# Patient Record
Sex: FEMALE | Race: WHITE | NOT HISPANIC OR LATINO | Employment: FULL TIME | URBAN - NONMETROPOLITAN AREA
[De-identification: names, ages, dates, MRNs, and addresses within clinical notes are randomized per-mention and may not be internally consistent; named-entity substitution may affect disease eponyms.]

---

## 2022-08-18 ENCOUNTER — OFFICE VISIT (OUTPATIENT)
Dept: FAMILY MEDICINE CLINIC | Facility: CLINIC | Age: 32
End: 2022-08-18

## 2022-08-18 VITALS
HEIGHT: 65 IN | BODY MASS INDEX: 36.82 KG/M2 | RESPIRATION RATE: 16 BRPM | OXYGEN SATURATION: 98 % | SYSTOLIC BLOOD PRESSURE: 116 MMHG | WEIGHT: 221 LBS | DIASTOLIC BLOOD PRESSURE: 75 MMHG | HEART RATE: 67 BPM | TEMPERATURE: 98 F

## 2022-08-18 DIAGNOSIS — L30.9 ECZEMA, UNSPECIFIED TYPE: Primary | ICD-10-CM

## 2022-08-18 DIAGNOSIS — Z76.89 ENCOUNTER TO ESTABLISH CARE: ICD-10-CM

## 2022-08-18 PROCEDURE — 99202 OFFICE O/P NEW SF 15 MIN: CPT | Performed by: NURSE PRACTITIONER

## 2022-08-18 RX ORDER — TOLTERODINE 4 MG/1
4 CAPSULE, EXTENDED RELEASE ORAL DAILY
COMMUNITY
Start: 2022-06-01

## 2022-08-18 RX ORDER — VIT C/B6/B5/MAGNESIUM/HERB 173 50-5-6-5MG
500 CAPSULE ORAL DAILY
COMMUNITY
Start: 2022-06-01

## 2022-08-18 RX ORDER — IBUPROFEN 200 MG
600 TABLET ORAL AS NEEDED
COMMUNITY

## 2022-08-18 RX ORDER — IBUPROFEN 800 MG
TABLET ORAL
COMMUNITY
End: 2022-12-12

## 2022-08-18 RX ORDER — OMEPRAZOLE 20 MG/1
CAPSULE, DELAYED RELEASE ORAL
COMMUNITY
End: 2022-11-17 | Stop reason: DRUGHIGH

## 2022-08-18 RX ORDER — CHOLECALCIFEROL (VITAMIN D3) 125 MCG
650 CAPSULE ORAL DAILY
COMMUNITY

## 2022-08-18 NOTE — PROGRESS NOTES
"Chief Complaint  Establish Care (Possible med refills- acid reflux rx)    Subjective        Jazmin Hunt presents to [facility]  History of Present Illness      Patient is here to establish care.  She has already had her annual physical prior to moving here.  She also has a rash on her RUE.  She has a history of eczema.  It itches and is patchy.  She has cream at home that she puts on it.      Objective   Vital Signs:  /75 (BP Location: Right arm, Patient Position: Sitting, Cuff Size: Adult)   Pulse 67   Temp 98 °F (36.7 °C) (Infrared)   Resp 16   Ht 165.1 cm (65\")   Wt 100 kg (221 lb)   SpO2 98%   BMI 36.78 kg/m²   Estimated body mass index is 36.78 kg/m² as calculated from the following:    Height as of this encounter: 165.1 cm (65\").    Weight as of this encounter: 100 kg (221 lb).           Physical Exam  Vitals and nursing note reviewed.   Constitutional:       Appearance: Normal appearance.   HENT:      Head: Normocephalic.      Nose: Nose normal.      Mouth/Throat:      Mouth: Mucous membranes are moist.   Eyes:      Extraocular Movements: Extraocular movements intact.      Pupils: Pupils are equal, round, and reactive to light.   Cardiovascular:      Rate and Rhythm: Normal rate and regular rhythm.      Pulses: Normal pulses.      Heart sounds: Normal heart sounds.   Pulmonary:      Effort: Pulmonary effort is normal.      Breath sounds: Normal breath sounds.   Abdominal:      General: Bowel sounds are normal.      Palpations: Abdomen is soft.   Musculoskeletal:         General: Normal range of motion.      Cervical back: Normal range of motion.   Skin:     General: Skin is warm and dry.      Findings: Rash present. Rash is scaling.          Neurological:      General: No focal deficit present.      Mental Status: She is alert and oriented to person, place, and time.   Psychiatric:         Mood and Affect: Mood normal.         Behavior: Behavior normal.      "   Result Review :                Assessment and Plan   Diagnoses and all orders for this visit:    1. Eczema, unspecified type (Primary)    2. Encounter to establish care    Offered patient prescription for steroid ointment for eczema flare-up but patient refused.  Encouraged to take tepid showers every other day at most and to apply an oatmeal-colloidal lotion to skin after shower.    Request records from PCP from annual physical for this year.       Follow Up   Return in about 6 months (around 2/18/2023) for Annual physical.  Patient was given instructions and counseling regarding her condition or for health maintenance advice. Please see specific information pulled into the AVS if appropriate.

## 2022-11-17 ENCOUNTER — OFFICE VISIT (OUTPATIENT)
Dept: FAMILY MEDICINE CLINIC | Facility: CLINIC | Age: 32
End: 2022-11-17

## 2022-11-17 VITALS
HEART RATE: 71 BPM | TEMPERATURE: 98.2 F | WEIGHT: 227 LBS | SYSTOLIC BLOOD PRESSURE: 126 MMHG | RESPIRATION RATE: 16 BRPM | DIASTOLIC BLOOD PRESSURE: 71 MMHG | OXYGEN SATURATION: 98 % | BODY MASS INDEX: 37.82 KG/M2 | HEIGHT: 65 IN

## 2022-11-17 DIAGNOSIS — R07.9 CHEST PAIN, UNSPECIFIED TYPE: ICD-10-CM

## 2022-11-17 DIAGNOSIS — R00.2 PALPITATIONS: ICD-10-CM

## 2022-11-17 DIAGNOSIS — K21.9 GASTROESOPHAGEAL REFLUX DISEASE WITHOUT ESOPHAGITIS: Primary | ICD-10-CM

## 2022-11-17 DIAGNOSIS — R19.06 EPIGASTRIC FULLNESS: ICD-10-CM

## 2022-11-17 PROCEDURE — 99214 OFFICE O/P EST MOD 30 MIN: CPT | Performed by: NURSE PRACTITIONER

## 2022-11-17 RX ORDER — OMEPRAZOLE 40 MG/1
40 CAPSULE, DELAYED RELEASE ORAL DAILY
Qty: 30 CAPSULE | Refills: 2 | Status: SHIPPED | OUTPATIENT
Start: 2022-11-17 | End: 2023-02-13

## 2022-11-17 NOTE — PROGRESS NOTES
Venipuncture Blood Specimen Collection  Venipuncture performed in [facility] by Sana Dias LPN with good hemostasis. Patient tolerated the procedure well without complications.   11/17/22   Sana Dias LPN

## 2022-11-17 NOTE — PROGRESS NOTES
"        Subjective     Chief Complaint   Patient presents with   • Chest Pain     Middle of chest, under left breast and left rib. Since June.    • Heartburn       History of Present Illness    Frequent aching pain in left midaxillarly line, occurring almost daily.  Pain started in June.  Patient states she has had an EKG, holter monitor, and \"full cardiac workup\" and they were normal.  Pain is sometimes worse with deep breaths. Occasaional heart palpations, but no correlation to pain.  Shortness of breath at times.  Her mother has a history of heart disease.     She feels like larger bites of food get stuck around midsternum but when she drinks water \"it pushes it down.\"  She has epigastric fullness when this occurs.          Patient's PMR from outside medical facility reviewed and noted.    Review of Systems   Respiratory: Positive for shortness of breath. Negative for cough, choking, chest tightness and wheezing.    Cardiovascular: Positive for chest pain. Negative for palpitations.   Neurological: Negative for dizziness, light-headedness and headaches.        Otherwise complete ROS reviewed and negative except as mentioned in the HPI.    Past Medical History:   Past Medical History:   Diagnosis Date   • Disc disease, degenerative, cervical     2 herniated discs     Past Surgical History:  Past Surgical History:   Procedure Laterality Date   • REFRACTIVE SURGERY     • TONSILLECTOMY       Social History:  reports that she has never smoked. She has never used smokeless tobacco. She reports current alcohol use. She reports that she does not use drugs.    Family History: family history includes Bipolar disorder in her father and sister; Heart disease in her mother.      Allergies:  Allergies   Allergen Reactions   • Contrast Dye Anaphylaxis     Medications:  Prior to Admission medications    Medication Sig Start Date End Date Taking? Authorizing Provider   Cholecalciferol (Vitamin D3) 10 MCG (400 UNIT) capsule    Yes " "Provider, MD Lynette   Echinacea 650 MG capsule    Yes Provider, MD Lynette   ibuprofen (ADVIL,MOTRIN) 800 MG tablet    Yes Provider, MD Lynette   omeprazole (priLOSEC) 20 MG capsule    Yes Provider, MD Lynette   tolterodine LA (DETROL LA) 4 MG 24 hr capsule  6/1/22  Yes Provider, MD Lynette   Turmeric 500 MG capsule  6/1/22  Yes Provider, MD Lynette         Objective     Vital Signs: /71 (BP Location: Left arm, Patient Position: Sitting, Cuff Size: Adult)   Pulse 71   Temp 98.2 °F (36.8 °C) (Infrared)   Resp 16   Ht 165.1 cm (65\")   Wt 103 kg (227 lb)   SpO2 98%   BMI 37.77 kg/m²   Physical Exam  Vitals and nursing note reviewed.   Constitutional:       Appearance: Normal appearance.   HENT:      Head: Normocephalic.      Nose: Nose normal.      Mouth/Throat:      Mouth: Mucous membranes are moist.   Eyes:      Extraocular Movements: Extraocular movements intact.      Pupils: Pupils are equal, round, and reactive to light.   Cardiovascular:      Rate and Rhythm: Normal rate and regular rhythm.      Pulses: Normal pulses.      Heart sounds: Normal heart sounds.   Pulmonary:      Effort: Pulmonary effort is normal.      Breath sounds: Normal breath sounds.   Chest:       Abdominal:      General: Bowel sounds are normal.      Palpations: Abdomen is soft.   Musculoskeletal:         General: Normal range of motion.      Cervical back: Normal range of motion and neck supple. Normal range of motion.   Lymphadenopathy:      Cervical: No cervical adenopathy.   Skin:     General: Skin is warm and dry.   Neurological:      General: No focal deficit present.      Mental Status: She is alert and oriented to person, place, and time.   Psychiatric:         Mood and Affect: Mood normal.         Behavior: Behavior normal.         Thought Content: Thought content normal.         Judgment: Judgment normal.           Results Reviewed:  Glucose   Date Value Ref Range Status   11/17/2022 97 70 - 99 " mg/dL Final     BUN   Date Value Ref Range Status   11/17/2022 14 6 - 20 mg/dL Final     Creatinine   Date Value Ref Range Status   11/17/2022 0.73 0.57 - 1.00 mg/dL Final     Sodium   Date Value Ref Range Status   11/17/2022 139 134 - 144 mmol/L Final     Potassium   Date Value Ref Range Status   11/17/2022 4.1 3.5 - 5.2 mmol/L Final     Chloride   Date Value Ref Range Status   11/17/2022 102 96 - 106 mmol/L Final     Total CO2   Date Value Ref Range Status   11/17/2022 23 20 - 29 mmol/L Final     Calcium   Date Value Ref Range Status   11/17/2022 9.3 8.7 - 10.2 mg/dL Final     ALT (SGPT)   Date Value Ref Range Status   11/17/2022 10 0 - 32 IU/L Final     AST (SGOT)   Date Value Ref Range Status   11/17/2022 16 0 - 40 IU/L Final     WBC   Date Value Ref Range Status   11/17/2022 6.3 3.4 - 10.8 x10E3/uL Final     Hematocrit   Date Value Ref Range Status   11/17/2022 41.4 34.0 - 46.6 % Final     Platelets   Date Value Ref Range Status   11/17/2022 241 150 - 450 x10E3/uL Final         Assessment / Plan     Assessment/Plan     Diagnoses and all orders for this visit:    1. Gastroesophageal reflux disease without esophagitis (Primary)  -     omeprazole (priLOSEC) 40 MG capsule; Take 1 capsule by mouth Daily for 90 days.  Dispense: 30 capsule; Refill: 2  -     Ambulatory Referral to Gastroenterology    2. Palpitations  -     Ambulatory Referral to Cardiology  -     CBC w AUTO Differential  -     Comprehensive metabolic panel  -     TSH  -     Vitamin D,25-Hydroxy    3. Chest pain, unspecified type  -     Ambulatory Referral to Cardiology  -     CBC w AUTO Differential  -     Comprehensive metabolic panel  -     TSH  -     Vitamin D,25-Hydroxy    4. Epigastric fullness  -     Ambulatory Referral to Gastroenterology         An After Visit Summary was printed and given to the patient at discharge.  Return in about 1 month (around 12/17/2022).    I have discussed the patient results/orders and plan/recommendation with them  at today's visit.      Isabel Potts, APRN   11/17/2022

## 2022-11-19 LAB
25(OH)D3+25(OH)D2 SERPL-MCNC: 30 NG/ML (ref 30–100)
ALBUMIN SERPL-MCNC: 4.3 G/DL (ref 3.8–4.8)
ALBUMIN/GLOB SERPL: 1.9 {RATIO} (ref 1.2–2.2)
ALP SERPL-CCNC: 58 IU/L (ref 44–121)
ALT SERPL-CCNC: 10 IU/L (ref 0–32)
AST SERPL-CCNC: 16 IU/L (ref 0–40)
BASOPHILS # BLD AUTO: 0 X10E3/UL (ref 0–0.2)
BASOPHILS NFR BLD AUTO: 0 %
BILIRUB SERPL-MCNC: 0.3 MG/DL (ref 0–1.2)
BUN SERPL-MCNC: 14 MG/DL (ref 6–20)
BUN/CREAT SERPL: 19 (ref 9–23)
CALCIUM SERPL-MCNC: 9.3 MG/DL (ref 8.7–10.2)
CHLORIDE SERPL-SCNC: 102 MMOL/L (ref 96–106)
CO2 SERPL-SCNC: 23 MMOL/L (ref 20–29)
CREAT SERPL-MCNC: 0.73 MG/DL (ref 0.57–1)
EGFRCR SERPLBLD CKD-EPI 2021: 111 ML/MIN/1.73
EOSINOPHIL # BLD AUTO: 0 X10E3/UL (ref 0–0.4)
EOSINOPHIL NFR BLD AUTO: 1 %
ERYTHROCYTE [DISTWIDTH] IN BLOOD BY AUTOMATED COUNT: 13.5 % (ref 11.7–15.4)
GLOBULIN SER CALC-MCNC: 2.3 G/DL (ref 1.5–4.5)
GLUCOSE SERPL-MCNC: 97 MG/DL (ref 70–99)
HCT VFR BLD AUTO: 41.4 % (ref 34–46.6)
HGB BLD-MCNC: 13.7 G/DL (ref 11.1–15.9)
IMM GRANULOCYTES # BLD AUTO: 0 X10E3/UL (ref 0–0.1)
IMM GRANULOCYTES NFR BLD AUTO: 0 %
LYMPHOCYTES # BLD AUTO: 2.1 X10E3/UL (ref 0.7–3.1)
LYMPHOCYTES NFR BLD AUTO: 33 %
MCH RBC QN AUTO: 29.6 PG (ref 26.6–33)
MCHC RBC AUTO-ENTMCNC: 33.1 G/DL (ref 31.5–35.7)
MCV RBC AUTO: 89 FL (ref 79–97)
MONOCYTES # BLD AUTO: 0.4 X10E3/UL (ref 0.1–0.9)
MONOCYTES NFR BLD AUTO: 6 %
NEUTROPHILS # BLD AUTO: 3.8 X10E3/UL (ref 1.4–7)
NEUTROPHILS NFR BLD AUTO: 60 %
PLATELET # BLD AUTO: 241 X10E3/UL (ref 150–450)
POTASSIUM SERPL-SCNC: 4.1 MMOL/L (ref 3.5–5.2)
PROT SERPL-MCNC: 6.6 G/DL (ref 6–8.5)
RBC # BLD AUTO: 4.63 X10E6/UL (ref 3.77–5.28)
SODIUM SERPL-SCNC: 139 MMOL/L (ref 134–144)
TSH SERPL DL<=0.005 MIU/L-ACNC: 2.48 UIU/ML (ref 0.45–4.5)
WBC # BLD AUTO: 6.3 X10E3/UL (ref 3.4–10.8)

## 2022-11-23 NOTE — PROGRESS NOTES
Spoke with patient, voiced understanding. She stated that she is already on a vitamin D3 supplement 1000 IU daily.

## 2022-12-12 ENCOUNTER — OFFICE VISIT (OUTPATIENT)
Dept: FAMILY MEDICINE CLINIC | Facility: CLINIC | Age: 32
End: 2022-12-12

## 2022-12-12 VITALS
SYSTOLIC BLOOD PRESSURE: 136 MMHG | DIASTOLIC BLOOD PRESSURE: 80 MMHG | OXYGEN SATURATION: 95 % | RESPIRATION RATE: 16 BRPM | TEMPERATURE: 98.6 F | BODY MASS INDEX: 40.04 KG/M2 | HEART RATE: 98 BPM | WEIGHT: 226 LBS | HEIGHT: 63 IN

## 2022-12-12 DIAGNOSIS — J01.10 ACUTE NON-RECURRENT FRONTAL SINUSITIS: ICD-10-CM

## 2022-12-12 DIAGNOSIS — E55.9 VITAMIN D DEFICIENCY: Primary | ICD-10-CM

## 2022-12-12 DIAGNOSIS — M79.641 RIGHT HAND PAIN: ICD-10-CM

## 2022-12-12 PROCEDURE — 99213 OFFICE O/P EST LOW 20 MIN: CPT | Performed by: NURSE PRACTITIONER

## 2022-12-12 RX ORDER — AZITHROMYCIN 250 MG/1
TABLET, FILM COATED ORAL
Qty: 6 TABLET | Refills: 0 | Status: SHIPPED | OUTPATIENT
Start: 2022-12-12 | End: 2022-12-29 | Stop reason: ALTCHOICE

## 2022-12-12 NOTE — PROGRESS NOTES
Subjective     Chief Complaint   Patient presents with   • Hand Pain     Right hand first two fingers    • Facial Pain   • Headache   • Vitamin D Deficiency     Questions about supplements for vitamin D deficiency        History of Present Illness    Sinus pressure, constant dull pain for 2 weeks.  Had the flu over Thanksgiving.  Denies fever currently.  Took sinus and cold medication and saline spray.      Vitamin D level 30 but has been taking vitmain D OTC for a year.  Continue to experience some fatigue.  She was on depo for a long time and she is concerned about possible osteoporosis.              Achey, burning pain in index and middle fingers in right hand.   She is taking ibuprofen almost daily for the last several weeks for pain.      Patient's PMR from outside medical facility reviewed and noted.    Review of Systems   Constitutional: Positive for fatigue. Negative for fever.   HENT: Positive for congestion, sinus pressure and sinus pain. Negative for sore throat.    Musculoskeletal: Positive for arthralgias.   Neurological: Negative for numbness.        Otherwise complete ROS reviewed and negative except as mentioned in the HPI.    Past Medical History:   Past Medical History:   Diagnosis Date   • Disc disease, degenerative, cervical     2 herniated discs     Past Surgical History:  Past Surgical History:   Procedure Laterality Date   • REFRACTIVE SURGERY     • TONSILLECTOMY       Social History:  reports that she has never smoked. She has never used smokeless tobacco. She reports current alcohol use. She reports that she does not use drugs.    Family History: family history includes Bipolar disorder in her father and sister; Heart disease in her mother.      Allergies:  Allergies   Allergen Reactions   • Contrast Dye Anaphylaxis     Medications:  Prior to Admission medications    Medication Sig Start Date End Date Taking? Authorizing Provider   Cholecalciferol (Vitamin D3) 10 MCG (400 UNIT)  "capsule    Yes Lynette Garcia MD   Echinacea 650 MG capsule    Yes Lynette Garcia MD   ibuprofen (ADVIL,MOTRIN) 800 MG tablet    Yes Lynette Garcia MD   omeprazole (priLOSEC) 40 MG capsule Take 1 capsule by mouth Daily for 90 days. 11/17/22 2/15/23 Yes Isabel Potts APRN   tolterodine LA (DETROL LA) 4 MG 24 hr capsule  6/1/22  Yes Lynette Garcia MD   Turmeric 500 MG capsule  6/1/22  Yes Lynette Garcia MD       Objective     Vital Signs: /80 (BP Location: Right arm, Patient Position: Sitting, Cuff Size: Adult)   Pulse 98   Temp 98.6 °F (37 °C) (Infrared)   Resp 16   Ht 160 cm (63\")   Wt 103 kg (226 lb)   SpO2 95%   BMI 40.03 kg/m²   Physical Exam  Vitals and nursing note reviewed.   Constitutional:       Appearance: Normal appearance.   HENT:      Head: Normocephalic.      Right Ear: Tympanic membrane normal.      Left Ear: Tympanic membrane normal.      Nose:      Right Sinus: Frontal sinus tenderness present.      Left Sinus: Frontal sinus tenderness present.      Mouth/Throat:      Mouth: Mucous membranes are moist.   Eyes:      Extraocular Movements: Extraocular movements intact.      Pupils: Pupils are equal, round, and reactive to light.   Cardiovascular:      Rate and Rhythm: Normal rate and regular rhythm.      Pulses: Normal pulses.      Heart sounds: Normal heart sounds.   Pulmonary:      Effort: Pulmonary effort is normal.      Breath sounds: Normal breath sounds.   Abdominal:      General: Bowel sounds are normal.      Palpations: Abdomen is soft.   Musculoskeletal:         General: Normal range of motion.      Right hand: Tenderness and bony tenderness present. Normal range of motion. Normal strength. Normal sensation.      Left hand: Normal.      Cervical back: Normal range of motion.   Skin:     General: Skin is warm and dry.   Neurological:      General: No focal deficit present.      Mental Status: She is alert and oriented to person, place, " and time.   Psychiatric:         Mood and Affect: Mood normal.         Behavior: Behavior normal.         Thought Content: Thought content normal.         Judgment: Judgment normal.         Results Reviewed:  Glucose   Date Value Ref Range Status   11/17/2022 97 70 - 99 mg/dL Final     BUN   Date Value Ref Range Status   11/17/2022 14 6 - 20 mg/dL Final     Creatinine   Date Value Ref Range Status   11/17/2022 0.73 0.57 - 1.00 mg/dL Final     Sodium   Date Value Ref Range Status   11/17/2022 139 134 - 144 mmol/L Final     Potassium   Date Value Ref Range Status   11/17/2022 4.1 3.5 - 5.2 mmol/L Final     Chloride   Date Value Ref Range Status   11/17/2022 102 96 - 106 mmol/L Final     Total CO2   Date Value Ref Range Status   11/17/2022 23 20 - 29 mmol/L Final     Calcium   Date Value Ref Range Status   11/17/2022 9.3 8.7 - 10.2 mg/dL Final     ALT (SGPT)   Date Value Ref Range Status   11/17/2022 10 0 - 32 IU/L Final     AST (SGOT)   Date Value Ref Range Status   11/17/2022 16 0 - 40 IU/L Final     WBC   Date Value Ref Range Status   11/17/2022 6.3 3.4 - 10.8 x10E3/uL Final     Hematocrit   Date Value Ref Range Status   11/17/2022 41.4 34.0 - 46.6 % Final     Platelets   Date Value Ref Range Status   11/17/2022 241 150 - 450 x10E3/uL Final         Assessment / Plan     Assessment/Plan     Diagnoses and all orders for this visit:    1. Vitamin D deficiency (Primary)  Comments:  Recheck level in 3 months.   Orders:  -     cholecalciferol (VITAMIN D3) 250 MCG (22388 UT) capsule; Take 1 capsule by mouth 1 (One) Time Per Week.  Dispense: 4 capsule; Refill: 11    2. Right hand pain  -     XR Hand 3+ View Right; Future    3. Acute non-recurrent frontal sinusitis  -     azithromycin (Zithromax Z-Lobo) 250 MG tablet; Take 2 tablets by mouth on day 1, then 1 tablet daily on days 2-5  Dispense: 6 tablet; Refill: 0         An After Visit Summary was printed and given to the patient at discharge.  Return in about 2 weeks (around  12/26/2022) for Recheck right hand.    I have discussed the patient results/orders and plan/recommendation with them at today's visit.      Isabel Potts, MICHAEL   12/12/2022

## 2022-12-20 ENCOUNTER — TELEPHONE (OUTPATIENT)
Dept: FAMILY MEDICINE CLINIC | Facility: CLINIC | Age: 32
End: 2022-12-20

## 2022-12-20 DIAGNOSIS — M79.641 RIGHT HAND PAIN: ICD-10-CM

## 2022-12-29 ENCOUNTER — OFFICE VISIT (OUTPATIENT)
Dept: CARDIOLOGY | Facility: CLINIC | Age: 32
End: 2022-12-29

## 2022-12-29 VITALS
BODY MASS INDEX: 39.76 KG/M2 | OXYGEN SATURATION: 99 % | HEART RATE: 76 BPM | SYSTOLIC BLOOD PRESSURE: 118 MMHG | DIASTOLIC BLOOD PRESSURE: 82 MMHG | HEIGHT: 63 IN | WEIGHT: 224.4 LBS

## 2022-12-29 DIAGNOSIS — R06.02 SHORTNESS OF BREATH: ICD-10-CM

## 2022-12-29 DIAGNOSIS — R00.2 PALPITATIONS: ICD-10-CM

## 2022-12-29 DIAGNOSIS — Z86.16 HISTORY OF COVID-19: ICD-10-CM

## 2022-12-29 DIAGNOSIS — E66.09 CLASS 2 OBESITY DUE TO EXCESS CALORIES WITH BODY MASS INDEX (BMI) OF 39.0 TO 39.9 IN ADULT, UNSPECIFIED WHETHER SERIOUS COMORBIDITY PRESENT: ICD-10-CM

## 2022-12-29 DIAGNOSIS — R07.89 CHEST PAIN, ATYPICAL: Primary | ICD-10-CM

## 2022-12-29 PROBLEM — U07.1 COVID-19: Status: ACTIVE | Noted: 2021-12-01

## 2022-12-29 PROCEDURE — 99215 OFFICE O/P EST HI 40 MIN: CPT | Performed by: NURSE PRACTITIONER

## 2022-12-29 PROCEDURE — 93000 ELECTROCARDIOGRAM COMPLETE: CPT | Performed by: NURSE PRACTITIONER

## 2022-12-29 NOTE — PROGRESS NOTES
Encounter Date:12/29/2022  Chief Complaint:   Subjective    Jazmin Hunt is a 32 y.o. female who presents to [facility] today for cardiac evaluation at the request of Isabel Potts NP with [facility] for chest pain.      History of Present Illness   HPI     Palpitations     Additional comments: Feels like it's pounding when takes a deep breath when having chest discomfort. Occurs every few months - not with every episode of chest discomfort.           Chest Pain     Additional comments: Midsternal - sudden onset - sharp, constant for 3 days - took a week to resolve. Went to ER at [facility] in [place]. Started on reflux med that helped. Still gets intermittent L lateral at rest, not related to eating or certain foods, worse with breathing sometimes sharp stabbing and then becomes dull goes away overnight at rest and exertion. Started in July. Mother has hx of heart failure - unknown type and cause - 70s/obese - may have been from heart valve issue. Occurs every few weeks.           new patient     Additional comments: Referred by ADELA RODRIGUEZ          Last edited by Jazmin Mehta APRN on 12/29/2022 11:47 AM.        Past Medical History:   Diagnosis Date   • Allergic rhinitis     grass, low eggs, cats   • COVID-19 12/2021    mild   • Disc disease, degenerative, cervical     2 herniated discs   • Lactose intolerance    • Obesity    • Overactive bladder      Past Surgical History:   Procedure Laterality Date   • REFRACTIVE SURGERY  04/2022   • TONSILLECTOMY  2012     Family History   Problem Relation Age of Onset   • Hypertension Mother    • Heart failure Mother    • Obesity Mother    • Bipolar disorder Father    • Bipolar disorder Sister    • Hypertension Maternal Grandmother    • Hypertension Maternal Grandfather    • Alzheimer's disease Maternal Grandfather    • No Known Problems Half-Brother      Social History     Socioeconomic History   •  "Marital status:    Tobacco Use   • Smoking status: Never     Passive exposure: Past   • Smokeless tobacco: Never   • Tobacco comments:     Friends but no family members   Vaping Use   • Vaping Use: Never used   Substance and Sexual Activity   • Alcohol use: Yes     Comment: ocassionally, never heavy   • Drug use: Not Currently     Types: Marijuana     Comment: tried THC as a teenager   • Sexual activity: Yes     Birth control/protection: Vasectomy     History: Past medical, surgical, family, and social history reviewed.    Outpatient Medications Marked as Taking for the 12/29/22 encounter (Office Visit) with Jazmin Mehta APRN   Medication Sig Dispense Refill   • cholecalciferol (VITAMIN D3) 250 MCG (14371 UT) capsule Take 1 capsule by mouth 1 (One) Time Per Week. 4 capsule 11   • Echinacea 650 MG capsule Take 650 mg by mouth Daily.     • Homeopathic Products (ZICAM COLD REMEDY PO) Take  by mouth As Needed.     • ibuprofen (ADVIL,MOTRIN) 200 MG tablet Take 600 mg by mouth As Needed. For chronic neck pain - herniated discs - several times a week for 15 years - trying to use less frequently     • Loratadine (CLARITIN PO) Take  by mouth Daily. Alternates with generic zyrtec about every 6 months     • omeprazole (priLOSEC) 40 MG capsule Take 1 capsule by mouth Daily for 90 days. 30 capsule 2   • tolterodine LA (DETROL LA) 4 MG 24 hr capsule Take 4 mg by mouth Daily.     • Turmeric 500 MG capsule Take 500 mg by mouth Daily.          Objective     Vital Signs:   /82 (BP Location: Left arm, Patient Position: Sitting, Cuff Size: Adult)   Pulse 76   Ht 160 cm (62.99\")   Wt 102 kg (224 lb 6.4 oz)   SpO2 99%   BMI 39.76 kg/m²   Wt Readings from Last 3 Encounters:   12/29/22 102 kg (224 lb 6.4 oz)   12/12/22 103 kg (226 lb)   11/17/22 103 kg (227 lb)         Vitals reviewed.   Constitutional:       Appearance: Well-developed. Morbidly obese.   Eyes:      General: No scleral icterus.  Neck:      " Vascular: No JVD.   Pulmonary:      Effort: Pulmonary effort is normal.      Breath sounds: Normal breath sounds.   Cardiovascular:      Normal rate. Regular rhythm.      No gallop.   Pulses:     Intact distal pulses.   Edema:     Peripheral edema absent.   Skin:     General: Skin is warm and dry.   Neurological:      Mental Status: Alert and oriented to person, place, and time.   Psychiatric:         Behavior: Behavior is cooperative.         Result Review  Data Reviewed:  The following data was reviewed by: MICHAEL Valdivia on 12/29/2022  Lab Results - Last 18 Months   Lab Units 11/17/22  1524   BUN mg/dL 14   CREATININE mg/dL 0.73   SODIUM mmol/L 139   POTASSIUM mmol/L 4.1   CHLORIDE mmol/L 102   CALCIUM mg/dL 9.3   ALBUMIN g/dL 4.3   BILIRUBIN mg/dL 0.3   ALK PHOS IU/L 58   AST (SGOT) IU/L 16   ALT (SGPT) IU/L 10   WBC x10E3/uL 6.3   RBC x10E6/uL 4.63   HEMATOCRIT % 41.4   MCV fL 89   MCH pg 29.6   TSH uIU/mL 2.480   VIT D 25 HYDROXY ng/mL 30.0            ECG 12 Lead    Date/Time: 12/29/2022 3:33 PM  Performed by: Jazmin Mehta APRN  Authorized by: Jazmin Mehta APRN   Comparison: not compared with previous ECG   Previous ECG: no previous ECG available  Rhythm: sinus rhythm  BPM: 70  Conduction: non-specific intraventricular conduction delay    Clinical impression: non-specific ECG               Assessment and Plan   Problem List Items Addressed This Visit        Cardiac and Vasculature    Palpitations    Current Assessment & Plan     Will consider heart monitoring if worsens in frequency or severity.         Relevant Orders    Adult Transthoracic Echo Complete W/ Cont if Necessary Per Protocol       Endocrine and Metabolic    Obesity    Current Assessment & Plan     Patient's (Body mass index is 39.76 kg/m².) indicates that they are morbidly obese (BMI > 40 or > 35 with obesity - related health condition) with health conditions that include GERD . Weight is newly identified. BMI is is  above average; BMI management plan is completed. We discussed healthy diet. Will recommend gradually increasing activity after echo result known.            Pulmonary and Pneumonias    Shortness of breath    Current Assessment & Plan     Most likely multifactorial. Check echocardiogram.            Symptoms and Signs    Chest pain, atypical - Primary    Current Assessment & Plan     Check echocardiogram, CRP, sed rate, and lipid panel. Possible long COVID syndrome but symptoms more consistent with GI and musculoskeletal causes. Will consider stress testing if exertional symptoms or systolic dysfunction/wall motion abnormalities on echocardiogram. Call if any worsening. She declines chest x-ray. Check fasting lipid panel.         Relevant Orders    Lipid Panel    High Sensitivity CRP    Sedimentation Rate    Adult Transthoracic Echo Complete W/ Cont if Necessary Per Protocol       Other    History of COVID-19    Overview     mild          Patient was given instructions and counseling regarding her condition or for health maintenance advice. Please see specific information pulled into the AVS if appropriate.    Follow Up :   Return in about 2 months (around 2/28/2023) for Recheck.       Time Spent: I spent 46 minutes caring for Jazmin on this date of service. This time includes time spent by me in the following activities: reviewing tests, performing a medically appropriate examination and/or evaluation, counseling and educating the patient/family/caregiver, ordering medications, tests, or procedures and documenting information in the medical record.     I spent 1 minutes on the separately reported service of ECG. This time is not included in the time used to support the E/M service also reported today.        MICHAEL Phillips, ACNP-BC, CHFN-BC

## 2022-12-29 NOTE — ASSESSMENT & PLAN NOTE
Check echocardiogram, CRP, sed rate, and lipid panel. Possible long COVID syndrome but symptoms more consistent with GI and musculoskeletal causes. Will consider stress testing if exertional symptoms or systolic dysfunction/wall motion abnormalities on echocardiogram. Call if any worsening. She declines chest x-ray. Check fasting lipid panel.

## 2022-12-29 NOTE — ASSESSMENT & PLAN NOTE
Patient's (Body mass index is 39.76 kg/m².) indicates that they are morbidly obese (BMI > 40 or > 35 with obesity - related health condition) with health conditions that include GERD . Weight is newly identified. BMI is is above average; BMI management plan is completed. We discussed healthy diet. Will recommend gradually increasing activity after echo result known.

## 2022-12-30 ENCOUNTER — PATIENT ROUNDING (BHMG ONLY) (OUTPATIENT)
Dept: CARDIOLOGY | Facility: CLINIC | Age: 32
End: 2022-12-30

## 2022-12-30 NOTE — PROGRESS NOTES
December 30, 2022    Hello, may I speak with Jazmin Hunt?    My name is [facility], Practice Lead       I am  with [facility] CARDIOLOGY [place] [facility]  [place] [address] [phone].    Before we get started may I verify your date of birth? 1990    I am calling to officially welcome you to our practice and ask about your recent visit. Is this a good time to talk? No answer left message     Tell me about your visit with us. What things went well?  no answer        We're always looking for ways to make our patients' experiences even better. Do you have recommendations on ways we may improve?  no answer, left message     Overall were you satisfied with your first visit to our practice? Left message        I appreciate you taking the time to speak with me today. Is there anything else I can do for you? Left message       Thank you, and have a great day.

## 2023-01-04 ENCOUNTER — OFFICE VISIT (OUTPATIENT)
Dept: GASTROENTEROLOGY | Facility: CLINIC | Age: 33
End: 2023-01-04
Payer: OTHER GOVERNMENT

## 2023-01-04 VITALS
BODY MASS INDEX: 37.32 KG/M2 | SYSTOLIC BLOOD PRESSURE: 110 MMHG | DIASTOLIC BLOOD PRESSURE: 76 MMHG | OXYGEN SATURATION: 98 % | HEIGHT: 65 IN | TEMPERATURE: 97.3 F | WEIGHT: 224 LBS | HEART RATE: 73 BPM

## 2023-01-04 DIAGNOSIS — R07.89 CHEST PAIN, ATYPICAL: ICD-10-CM

## 2023-01-04 DIAGNOSIS — Z79.1 NSAID LONG-TERM USE: ICD-10-CM

## 2023-01-04 DIAGNOSIS — R10.13 EPIGASTRIC PAIN: ICD-10-CM

## 2023-01-04 DIAGNOSIS — R13.19 ESOPHAGEAL DYSPHAGIA: Primary | ICD-10-CM

## 2023-01-04 PROCEDURE — 99214 OFFICE O/P EST MOD 30 MIN: CPT | Performed by: NURSE PRACTITIONER

## 2023-01-04 PROCEDURE — 1159F MED LIST DOCD IN RCRD: CPT | Performed by: NURSE PRACTITIONER

## 2023-01-04 PROCEDURE — 1160F RVW MEDS BY RX/DR IN RCRD: CPT | Performed by: NURSE PRACTITIONER

## 2023-01-04 NOTE — PROGRESS NOTES
Primary Physician: Isabel Potts APRN    Chief Complaint   Patient presents with   • Difficulty Swallowing     Pt c/o trouble swallowing since last summer-feels like food \"hang\" in her chest and the only way she can get it down is to drink something   • Heartburn     Pt started having central chest pain since last summer-has been to ER numerous times and is currently undergoing cardiac workup-pt's PCP started her on Omeprazole and pt states she hasn't had any more pain/heartburn        Subjective     Jazmin Hunt is a 32 y.o. female.    HPI   Epigastric Discomfort  Patient was having pain in the epigastric region and mid chest region since June 2022.  Even went to the emergency room after having pain for 3 days.  She had EKG and they told her it was likely acid reflux.  Saw Cardiology related to pain under her left breast.  Her EKG was stable but Cardiology is working that up. The pain has subsided since initiating omeprazole once daily.  November 17, 2022 Labs:CBC normal, CMP normal, TSH normal  She also has a feeling that food does not pass through her mid esophagus. Worse with foods, does ok with liquids.    She takes Ibuprofen for herniated disc on her neck for the past 11 years.  She will take 600mg-800mg at least once week.    Past Medical History:   Diagnosis Date   • Allergic rhinitis     grass, low eggs, cats   • COVID-19 12/2021    mild   • Disc disease, degenerative, cervical     2 herniated discs   • Lactose intolerance    • Obesity    • Overactive bladder        Past Surgical History:   Procedure Laterality Date   • REFRACTIVE SURGERY  04/2022   • TONSILLECTOMY  2012        Current Outpatient Medications:   •  cholecalciferol (VITAMIN D3) 250 MCG (19228 UT) capsule, Take 1 capsule by mouth 1 (One) Time Per Week., Disp: 4 capsule, Rfl: 11  •  Echinacea 650 MG capsule, Take 650 mg by mouth Daily., Disp: , Rfl:   •  Homeopathic Products (ZICAM COLD REMEDY PO), Take  by mouth As Needed., Disp:  , Rfl:   •  ibuprofen (ADVIL,MOTRIN) 200 MG tablet, Take 600 mg by mouth As Needed. For chronic neck pain - herniated discs - several times a week for 15 years - trying to use less frequently, Disp: , Rfl:   •  Loratadine (CLARITIN PO), Take  by mouth Daily. Alternates with generic zyrtec about every 6 months, Disp: , Rfl:   •  omeprazole (priLOSEC) 40 MG capsule, Take 1 capsule by mouth Daily for 90 days., Disp: 30 capsule, Rfl: 2  •  tolterodine LA (DETROL LA) 4 MG 24 hr capsule, Take 4 mg by mouth Daily., Disp: , Rfl:   •  Turmeric 500 MG capsule, Take 500 mg by mouth Daily., Disp: , Rfl:     Allergies   Allergen Reactions   • Contrast Dye Anaphylaxis       Social History     Socioeconomic History   • Marital status:    Tobacco Use   • Smoking status: Never     Passive exposure: Past   • Smokeless tobacco: Never   • Tobacco comments:     Friends but no family members   Vaping Use   • Vaping Use: Never used   Substance and Sexual Activity   • Alcohol use: Yes     Comment: Occasionally   • Drug use: Not Currently     Types: Marijuana     Comment: tried THC as a teenager   • Sexual activity: Yes     Birth control/protection: Vasectomy       Family History   Problem Relation Age of Onset   • Hypertension Mother    • Heart failure Mother    • Obesity Mother    • Bipolar disorder Father    • Bipolar disorder Sister    • Hypertension Maternal Grandmother    • Hypertension Maternal Grandfather    • Alzheimer's disease Maternal Grandfather    • No Known Problems Half-Brother    • Colon cancer Neg Hx    • Colon polyps Neg Hx    • Esophageal cancer Neg Hx    • Liver cancer Neg Hx    • Stomach cancer Neg Hx    • Liver disease Neg Hx    • Rectal cancer Neg Hx        Review of Systems   Constitutional: Negative for unexpected weight change.   Respiratory: Negative for shortness of breath.    Cardiovascular: Negative for chest pain.        Dull pain under her left breast       Objective     /76 (BP Location: Left  arm, Patient Position: Sitting, Cuff Size: Adult)   Pulse 73   Temp 97.3 °F (36.3 °C) (Infrared)   Ht 165.1 cm (65\")   Wt 102 kg (224 lb)   SpO2 98%   Breastfeeding No   BMI 37.28 kg/m²     Physical Exam  Vitals reviewed.   Constitutional:       Appearance: Normal appearance.   Cardiovascular:      Rate and Rhythm: Normal rate and regular rhythm.   Pulmonary:      Effort: Pulmonary effort is normal.      Breath sounds: Normal breath sounds.   Neurological:      Mental Status: She is alert.         Lab Results - Last 18 Months   Lab Units 11/17/22  1524   GLUCOSE mg/dL 97   BUN mg/dL 14   CREATININE mg/dL 0.73   SODIUM mmol/L 139   POTASSIUM mmol/L 4.1   CHLORIDE mmol/L 102   TOTAL CO2 mmol/L 23   ALBUMIN g/dL 4.3   ALT (SGPT) IU/L 10   AST (SGOT) IU/L 16   ALK PHOS IU/L 58   BILIRUBIN mg/dL 0.3       Lab Results - Last 18 Months   Lab Units 11/17/22  1524   HEMOGLOBIN g/dL 13.7   HEMATOCRIT % 41.4   MCV fL 89   WBC x10E3/uL 6.3   RDW % 13.5   PLATELETS x10E3/uL 241       Lab Results - Last 18 Months   Lab Units 11/17/22  1524   TSH uIU/mL 2.480   VIT D 25 HYDROXY ng/mL 30.0          IMPRESSION/PLAN:    Assessment & Plan      Problem List Items Addressed This Visit        Gastrointestinal Abdominal     Epigastric pain    Overview     Epigastric pain/noncardiac chest pain: Improved with initiation of omeprazole daily         Relevant Orders    Case Request (Completed)    Esophageal dysphagia - Primary    Overview     Food getting lodged in mid esophagus since July 2022, on and off         Relevant Orders    Case Request (Completed)       Health Encounters    NSAID long-term use    Overview     Has herniated disc of neck and uses Ibuprofen for 11 years for pain reducer            Symptoms and Signs    Chest pain, atypical    Overview     Pain under her left breast, worse with deep breath  Seeing cardiology.  EKG non-acute.  Having further testing next week          Pt is having further cardiology work up next  week in [place] so we will post pone Endoscopy until next month once her cardiology evaluation is complete.  Continue Omeprazole for now and try to Limit NSAIDS  Pt instructed to call us following her cardiology work up if they find any abnormal cardiology issues and definitely let us know of any start of blood thinners as we would Need to know this prior to endoscopy.  Pt verbalizes understanding.    ..Pt is instructed to avoid caffeine, chocolate, peppermint and nicotine.  She is to avoid eating within 2-3 hours prior to bedtime.    ..The risks, benefits, and alternatives of endoscopy were reviewed with the patient today.  Risks including perforation, with or without dilation, possibly requiring surgery.  Additional risks include risk of bleeding.  There is also the risk of a drug reaction or problems with anesthesia.  This will be discussed with the further by the anesthesia team on the day of the procedure. The benefits include the diagnosis and management of disease of the upper digestive tract.  Alternatives to endoscopy include upper GI series, laboratory testing, radiographic evaluation, or no intervention.  The patient verbalizes understanding and agrees.    In accordance with requirements under the Affordable Care Act, [facility] has provided pricing for all hospital services and items on each of its websites. However, a patient's actual cost may differ based on the services the patient receives to meet individual healthcare needs and based on the benefits provided under the patient’s insurance coverage.                      Angelique Howard, APRN  01/04/23  14:37 CST    Part of this note may be an electronic transcription/translation of spoken language to printed text.

## 2023-01-11 ENCOUNTER — OUTSIDE FACILITY SERVICE (OUTPATIENT)
Dept: CARDIOLOGY | Facility: CLINIC | Age: 33
End: 2023-01-11
Payer: OTHER GOVERNMENT

## 2023-01-11 ENCOUNTER — TELEPHONE (OUTPATIENT)
Dept: CARDIOLOGY | Facility: CLINIC | Age: 33
End: 2023-01-11
Payer: OTHER GOVERNMENT

## 2023-01-11 PROCEDURE — 93306 TTE W/DOPPLER COMPLETE: CPT | Performed by: INTERNAL MEDICINE

## 2023-01-11 NOTE — TELEPHONE ENCOUNTER
Left patient voicemail to see where in [place] she went to the ED 7/2022. Also to see if she had labs drawn that Jazmin Mehta ordered at her appointment.

## 2023-01-11 NOTE — TELEPHONE ENCOUNTER
Pt called returning Yari ISABEL's message.  She stated that the ER she visited was called [facility] in [place], [place].  She has also not had a chance to get her bloodwork that was ordered completed.  She has an appointment today at [facility] and was going to go to the lab today to have that done.

## 2023-01-12 DIAGNOSIS — R07.89 CHEST PAIN, ATYPICAL: ICD-10-CM

## 2023-01-12 DIAGNOSIS — R00.2 PALPITATIONS: ICD-10-CM

## 2023-01-12 NOTE — PROGRESS NOTES
If they find what's causing her issues on the EGD and she's feeling better, she can call and cancel f/u with me for 3/3/23 and return as needed. Her PCP can order repeat echo in 3-5 years. TX!

## 2023-01-12 NOTE — PROGRESS NOTES
Please notify patient no evidence of heart failure on echocardiogram with only trivial tricuspid valve regurgitation and mild pulmonic valve regurgitation which shouldn't be causing any symptoms or problems. Recommend recheck echo in 3-5 years, sooner if has palpitations, swelling, smothering when she lies flat, or waking up short of breath at night, or exertional chest discomfort. No wall motion abnormalities - no need for stress test at this time. Await GI evaluation. Call if any worsening. TX!

## 2023-01-12 NOTE — PROGRESS NOTES
Jazmin Hunt notified and voiced understanding. Saw gastro, having stop done 2-15-23. They told her that her symptoms are most likely gastro related. Does patient still need to be seen 3-3-23 with you, I assumed so, but she wanted me to find out. Had labs also. Will obtain for review. Thanks

## 2023-02-06 ENCOUNTER — ANESTHESIA (OUTPATIENT)
Dept: GASTROENTEROLOGY | Facility: HOSPITAL | Age: 33
End: 2023-02-06
Payer: OTHER GOVERNMENT

## 2023-02-06 ENCOUNTER — ANESTHESIA EVENT (OUTPATIENT)
Dept: GASTROENTEROLOGY | Facility: HOSPITAL | Age: 33
End: 2023-02-06
Payer: OTHER GOVERNMENT

## 2023-02-06 ENCOUNTER — HOSPITAL ENCOUNTER (OUTPATIENT)
Facility: HOSPITAL | Age: 33
Setting detail: HOSPITAL OUTPATIENT SURGERY
Discharge: HOME | End: 2023-02-06
Attending: INTERNAL MEDICINE | Admitting: INTERNAL MEDICINE
Payer: OTHER GOVERNMENT

## 2023-02-06 VITALS
HEART RATE: 71 BPM | BODY MASS INDEX: 37.32 KG/M2 | OXYGEN SATURATION: 94 % | DIASTOLIC BLOOD PRESSURE: 70 MMHG | SYSTOLIC BLOOD PRESSURE: 114 MMHG | HEIGHT: 65 IN | WEIGHT: 224 LBS | RESPIRATION RATE: 14 BRPM | TEMPERATURE: 97.3 F

## 2023-02-06 DIAGNOSIS — R13.19 ESOPHAGEAL DYSPHAGIA: ICD-10-CM

## 2023-02-06 DIAGNOSIS — R10.13 EPIGASTRIC PAIN: ICD-10-CM

## 2023-02-06 LAB — B-HCG UR QL: NEGATIVE

## 2023-02-06 PROCEDURE — 43235 EGD DIAGNOSTIC BRUSH WASH: CPT | Performed by: INTERNAL MEDICINE

## 2023-02-06 PROCEDURE — 25010000002 PROPOFOL 10 MG/ML EMULSION: Performed by: NURSE ANESTHETIST, CERTIFIED REGISTERED

## 2023-02-06 PROCEDURE — 81025 URINE PREGNANCY TEST: CPT | Performed by: ANESTHESIOLOGY

## 2023-02-06 RX ORDER — LIDOCAINE HYDROCHLORIDE 20 MG/ML
INJECTION, SOLUTION EPIDURAL; INFILTRATION; INTRACAUDAL; PERINEURAL AS NEEDED
Status: DISCONTINUED | OUTPATIENT
Start: 2023-02-06 | End: 2023-02-06 | Stop reason: SURG

## 2023-02-06 RX ORDER — LIDOCAINE HYDROCHLORIDE 10 MG/ML
0.5 INJECTION, SOLUTION EPIDURAL; INFILTRATION; INTRACAUDAL; PERINEURAL ONCE AS NEEDED
Status: DISCONTINUED | OUTPATIENT
Start: 2023-02-06 | End: 2023-02-06 | Stop reason: HOSPADM

## 2023-02-06 RX ORDER — SODIUM CHLORIDE 0.9 % (FLUSH) 0.9 %
10 SYRINGE (ML) INJECTION AS NEEDED
Status: DISCONTINUED | OUTPATIENT
Start: 2023-02-06 | End: 2023-02-06 | Stop reason: HOSPADM

## 2023-02-06 RX ORDER — PROPOFOL 10 MG/ML
VIAL (ML) INTRAVENOUS AS NEEDED
Status: DISCONTINUED | OUTPATIENT
Start: 2023-02-06 | End: 2023-02-06 | Stop reason: SURG

## 2023-02-06 RX ORDER — SODIUM CHLORIDE 9 MG/ML
500 INJECTION, SOLUTION INTRAVENOUS CONTINUOUS PRN
Status: DISCONTINUED | OUTPATIENT
Start: 2023-02-06 | End: 2023-02-06 | Stop reason: HOSPADM

## 2023-02-06 RX ADMIN — PROPOFOL INJECTABLE EMULSION 300 MG: 10 INJECTION, EMULSION INTRAVENOUS at 13:14

## 2023-02-06 RX ADMIN — LIDOCAINE HYDROCHLORIDE 100 MG: 20 INJECTION, SOLUTION EPIDURAL; INFILTRATION; INTRACAUDAL; PERINEURAL at 13:14

## 2023-02-06 RX ADMIN — SODIUM CHLORIDE 500 ML: 9 INJECTION, SOLUTION INTRAVENOUS at 10:09

## 2023-02-06 NOTE — H&P
Chief Complaint:   Midepigastric pain and Dysphagia     Subjective     HPI:   Epigastric Discomfort/dysphagia  Patient was having pain in the epigastric region and mid chest region since June 2022.  Even went to the emergency room after having pain for 3 days.  She had EKG and they told her it was likely acid reflux.  Saw Cardiology related to pain under her left breast.  The pain has subsided since initiating omeprazole once daily.    She also has a feeling that food does not pass through her mid esophagus. Worse with foods, does ok with liquids.     She takes Ibuprofen for herniated disc on her neck for the past 11 years.  She will take 600mg-800mg at least once week.    Past Medical History:   Past Medical History:   Diagnosis Date   • Allergic rhinitis     grass, low eggs, cats   • COVID-19 12/2021    mild   • Disc disease, degenerative, cervical     2 herniated discs   • Lactose intolerance    • Obesity    • Overactive bladder        Past Surgical History:  Past Surgical History:   Procedure Laterality Date   • REFRACTIVE SURGERY  04/2022   • TONSILLECTOMY  2012       Family History:  Family History   Problem Relation Age of Onset   • Hypertension Mother    • Heart failure Mother    • Obesity Mother    • Bipolar disorder Father    • Bipolar disorder Sister    • Hypertension Maternal Grandmother    • Hypertension Maternal Grandfather    • Alzheimer's disease Maternal Grandfather    • No Known Problems Half-Brother    • Colon cancer Neg Hx    • Colon polyps Neg Hx    • Esophageal cancer Neg Hx    • Liver cancer Neg Hx    • Stomach cancer Neg Hx    • Liver disease Neg Hx    • Rectal cancer Neg Hx        Social History:   reports that she has never smoked. She has been exposed to tobacco smoke. She has never used smokeless tobacco. She reports current alcohol use. She reports that she does not currently use drugs after having used the following drugs: Marijuana.    Medications:   Medications Prior to Admission  "  Medication Sig Dispense Refill Last Dose   • Echinacea 650 MG capsule Take 650 mg by mouth Daily.   2/5/2023   • Homeopathic Products (ZICAM COLD REMEDY PO) Take  by mouth As Needed.   2/5/2023   • Loratadine (CLARITIN PO) Take  by mouth Daily. Alternates with generic zyrtec about every 6 months   2/5/2023   • omeprazole (priLOSEC) 40 MG capsule Take 1 capsule by mouth Daily for 90 days. 30 capsule 2 2/5/2023   • tolterodine LA (DETROL LA) 4 MG 24 hr capsule Take 4 mg by mouth Daily.   2/5/2023   • Turmeric 500 MG capsule Take 500 mg by mouth Daily.   2/5/2023   • cholecalciferol (VITAMIN D3) 250 MCG (15786 UT) capsule Take 1 capsule by mouth 1 (One) Time Per Week. 4 capsule 11 2/4/2023   • ibuprofen (ADVIL,MOTRIN) 200 MG tablet Take 600 mg by mouth As Needed. For chronic neck pain - herniated discs - several times a week for 15 years - trying to use less frequently   More than a month       Allergies:  Contrast dye (echo or unknown ct/mr)    ROS:    Resp: No SOA  Cardiovascular: No CP      Objective     /60 (Patient Position: Sitting)   Pulse 76   Temp 97.3 °F (36.3 °C) (Temporal)   Resp 18   Ht 165.1 cm (65\")   Wt 102 kg (224 lb)   LMP 02/01/2023 (Within Days)   SpO2 97%   BMI 37.28 kg/m²     Physical Exam   Constitutional: Pt is oriented to person, place, and in no distress.   Pulmonary/Chest: No distress.  No audible wheezes  Psychiatric: Mood, memory, affect and judgment appear normal.     Assessment & Plan     Diagnosis:  Midepigastric pain  Dysphagia    Anticipated Surgical Procedure:  Endoscopy    The risks, benefits, and alternatives of endoscopy were reviewed with the patient today.  Risks including perforation, with or without dilation, possibly requiring surgery.  Additional risks include risk of bleeding.  There is also the risk of a drug reaction or problems with anesthesia.  This will be discussed with the further by the anesthesia team on the day of the procedure. The benefits include " the diagnosis and management of disease of the upper digestive tract.  Alternatives to endoscopy include upper GI series, laboratory testing, radiographic evaluation, or no intervention.  The patient verbalizes understanding and agrees.    Much of this encounter note is an electronic transcription/translation of spoken language to printed text. The electronic translation of spoken language may permit erroneous, or at times, nonsensical words or phrases to be inadvertently transcribed; although I have reviewed the note for such errors, some may still exist.

## 2023-02-06 NOTE — ANESTHESIA POSTPROCEDURE EVALUATION
"Patient: Jazmin Hunt    Procedure Summary     Date: 02/06/23 Room / Location: [facility] ENDOSCOPY 6 /  [facility]    Anesthesia Start: 1310 Anesthesia Stop: 1324    Procedure: ESOPHAGOGASTRODUODENOSCOPY WITH ANESTHESIA Diagnosis:       Esophageal dysphagia      Epigastric pain      (Esophageal dysphagia [R13.19])      (Epigastric pain [R10.13])    Surgeons: Laura Holloway MD Provider: Ayala Corona CRNA    Anesthesia Type: MAC ASA Status: 2          Anesthesia Type: MAC    Vitals  Vitals Value Taken Time   /70 02/06/23 1341   Temp     Pulse 73 02/06/23 1342   Resp 14 02/06/23 1340   SpO2 96 % 02/06/23 1342   Vitals shown include unvalidated device data.        Post Anesthesia Care and Evaluation    Patient location during evaluation: PACU  Patient participation: complete - patient participated  Level of consciousness: awake and awake and alert  Pain score: 0  Pain management: adequate    Airway patency: patent  Anesthetic complications: No anesthetic complications  PONV Status: none  Cardiovascular status: acceptable  Respiratory status: acceptable  Hydration status: acceptable    Comments: Patient discharged according to acceptable James score per RN assessment. See nursing records for further information.     Blood pressure 114/70, pulse 71, temperature 97.3 °F (36.3 °C), temperature source Temporal, resp. rate 14, height 165.1 cm (65\"), weight 102 kg (224 lb), last menstrual period 02/01/2023, SpO2 94 %, not currently breastfeeding.        "

## 2023-02-06 NOTE — ANESTHESIA PREPROCEDURE EVALUATION
Anesthesia Evaluation     Patient summary reviewed   no history of anesthetic complications:  NPO Solid Status: > 8 hours  NPO Liquid Status: > 8 hours           Airway   Mallampati: I  TM distance: >3 FB  Neck ROM: full  No difficulty expected  Dental - normal exam     Pulmonary - negative pulmonary ROS   Cardiovascular   Exercise tolerance: good (4-7 METS)        Neuro/Psych- negative ROS  GI/Hepatic/Renal/Endo    (+) obesity,       Musculoskeletal     Abdominal    Substance History      OB/GYN          Other                        Anesthesia Plan    ASA 2     MAC       Anesthetic plan, risks, benefits, and alternatives have been provided, discussed and informed consent has been obtained with: patient.        CODE STATUS:

## 2023-02-12 DIAGNOSIS — K21.9 GASTROESOPHAGEAL REFLUX DISEASE WITHOUT ESOPHAGITIS: ICD-10-CM

## 2023-02-13 RX ORDER — OMEPRAZOLE 40 MG/1
CAPSULE, DELAYED RELEASE ORAL
Qty: 90 CAPSULE | Refills: 1 | Status: SHIPPED | OUTPATIENT
Start: 2023-02-13

## 2023-02-13 NOTE — TELEPHONE ENCOUNTER
Patient notified and asked about her Vitamin D. Let her know she is due to have that rechecked and she will set up to have that done.

## 2023-02-13 NOTE — TELEPHONE ENCOUNTER
Pending Prescriptions:                       Disp   Refills    omeprazole (priLOSEC) 40 MG capsule [Pharm*90 cap*         Sig: TAKE 1 CAPSULE BY MOUTH EVERY DAY

## 2023-02-21 ENCOUNTER — TELEPHONE (OUTPATIENT)
Dept: FAMILY MEDICINE CLINIC | Facility: CLINIC | Age: 33
End: 2023-02-21
Payer: OTHER GOVERNMENT

## 2023-02-21 NOTE — TELEPHONE ENCOUNTER
Caller: Jazmin Hunt    Relationship: Self    Best call back number:  [phone]      Who are you requesting to speak with     LYUBOV RADFORD      Do you know the name of the person who called:     JAZMIN    What was the call regarding:     PATIENT HAS DONE SOME RESEARCH AND ALSO HAS FOUND THAT AN ENLARGED SPLEEN CAN CAUSE ACID REFLUX.     COULD IT BE POSSIBLE SOMETHING ELSE?    PATIENT IS HAVING DISCOMFORT IN THAT GENERAL AREA UNDER LEFT RIB.     DOES FURTHER TESTING NEED TO BE DONE?      Do you require a callback:     YES, PLEASE ADVISE

## 2023-07-31 ENCOUNTER — OFFICE VISIT (OUTPATIENT)
Dept: FAMILY MEDICINE CLINIC | Facility: CLINIC | Age: 33
End: 2023-07-31
Payer: COMMERCIAL

## 2023-07-31 VITALS
WEIGHT: 235 LBS | OXYGEN SATURATION: 98 % | BODY MASS INDEX: 39.15 KG/M2 | DIASTOLIC BLOOD PRESSURE: 80 MMHG | HEIGHT: 65 IN | HEART RATE: 66 BPM | SYSTOLIC BLOOD PRESSURE: 115 MMHG | TEMPERATURE: 98.6 F

## 2023-07-31 DIAGNOSIS — N32.81 OVERACTIVE BLADDER: Primary | Chronic | ICD-10-CM

## 2023-07-31 DIAGNOSIS — E66.09 CLASS 2 OBESITY DUE TO EXCESS CALORIES WITHOUT SERIOUS COMORBIDITY IN ADULT, UNSPECIFIED BMI: Chronic | ICD-10-CM

## 2023-07-31 DIAGNOSIS — J01.01 ACUTE RECURRENT MAXILLARY SINUSITIS: ICD-10-CM

## 2023-07-31 PROCEDURE — 99214 OFFICE O/P EST MOD 30 MIN: CPT | Performed by: NURSE PRACTITIONER

## 2023-07-31 RX ORDER — OXYBUTYNIN CHLORIDE 5 MG/1
5 TABLET, EXTENDED RELEASE ORAL DAILY
Qty: 30 TABLET | Refills: 5 | Status: SHIPPED | OUTPATIENT
Start: 2023-07-31

## 2023-07-31 RX ORDER — AMOXICILLIN AND CLAVULANATE POTASSIUM 875; 125 MG/1; MG/1
1 TABLET, FILM COATED ORAL 2 TIMES DAILY
Qty: 14 TABLET | Refills: 0 | Status: SHIPPED | OUTPATIENT
Start: 2023-07-31 | End: 2023-08-07

## 2023-07-31 RX ORDER — TOLTERODINE 4 MG/1
4 CAPSULE, EXTENDED RELEASE ORAL DAILY
Qty: 30 CAPSULE | Refills: 5 | Status: SHIPPED | OUTPATIENT
Start: 2023-07-31

## 2023-08-14 PROBLEM — N32.81 OVERACTIVE BLADDER: Chronic | Status: ACTIVE | Noted: 2023-08-14

## 2023-08-21 ENCOUNTER — TELEPHONE (OUTPATIENT)
Dept: FAMILY MEDICINE CLINIC | Facility: CLINIC | Age: 33
End: 2023-08-21

## 2023-08-21 DIAGNOSIS — J01.01 ACUTE RECURRENT MAXILLARY SINUSITIS: Primary | ICD-10-CM

## 2023-08-21 DIAGNOSIS — J34.89 NASAL DRYNESS: ICD-10-CM

## 2023-08-21 RX ORDER — AZELASTINE 1 MG/ML
2 SPRAY, METERED NASAL 2 TIMES DAILY
Qty: 30 ML | Refills: 6 | Status: SHIPPED | OUTPATIENT
Start: 2023-08-21

## 2023-08-21 NOTE — TELEPHONE ENCOUNTER
"  Caller: Jazmin Hunt \"Waleska\"    Relationship: Self    Best call back number:  [phone]     What medication are you requesting:  ANTIBIOTIC    What are your current symptoms:  DRY SINUS CAVITY, SINUS HEADACHE, CRUSTY MUCUS    How long have you been experiencing symptoms:  THE LAST 2 DAYS    Have you had these symptoms before:    [x] Yes  [] No    Have you been treated for these symptoms before:   [x] Yes  [] No    If a prescription is needed, what is your preferred pharmacy and phone number: [facility] #[id number] - [place], [place] - [address] AT [facility] [phone] [facility] [phone]      Additional notes:  PATIENT SAID SHE THINKS HER SINUS INFECTION HAS RETURNED, AS THE SYMPTOMS ARE THE SAME AS PREVIOUSLY.  PATIENT SAID SHE THINKS THE INFECTION RETURNED BECAUSE SHE STARTED USING HER NASAL ALLERGY SPRAY AGAIN.            "

## 2023-08-25 ENCOUNTER — OFFICE VISIT (OUTPATIENT)
Dept: FAMILY MEDICINE CLINIC | Facility: CLINIC | Age: 33
End: 2023-08-25
Payer: COMMERCIAL

## 2023-08-25 VITALS
HEART RATE: 87 BPM | DIASTOLIC BLOOD PRESSURE: 71 MMHG | TEMPERATURE: 98 F | OXYGEN SATURATION: 98 % | WEIGHT: 229 LBS | HEIGHT: 65 IN | RESPIRATION RATE: 18 BRPM | SYSTOLIC BLOOD PRESSURE: 109 MMHG | BODY MASS INDEX: 38.15 KG/M2

## 2023-08-25 DIAGNOSIS — E66.09 CLASS 2 OBESITY DUE TO EXCESS CALORIES WITHOUT SERIOUS COMORBIDITY IN ADULT, UNSPECIFIED BMI: Chronic | ICD-10-CM

## 2023-08-25 DIAGNOSIS — J32.9 RECURRENT SINUSITIS: Primary | ICD-10-CM

## 2023-08-25 DIAGNOSIS — D22.9 BENIGN MOLE: ICD-10-CM

## 2023-08-25 PROCEDURE — 99213 OFFICE O/P EST LOW 20 MIN: CPT | Performed by: NURSE PRACTITIONER

## 2023-08-25 RX ORDER — CEFDINIR 300 MG/1
300 CAPSULE ORAL 2 TIMES DAILY
Qty: 14 CAPSULE | Refills: 0 | Status: SHIPPED | OUTPATIENT
Start: 2023-08-25 | End: 2023-09-01

## 2023-08-25 NOTE — PROGRESS NOTES
Subjective     Chief Complaint   Patient presents with    Sinus Problem     Dry nasal cavity, sinus pressure in head, minor congestion        History of Present Illness    Patient presents today with recurrent left sinusitis.  Patient was treated for sinusitis on 7/31/23 with augmentin.  Patient states that symptoms resolved but she thinks that she reinfected herself when she started using the same nasal spray bottle.  Left side of face is tender. Patient is going out of town this weekend.      Mole on right ankle that bleeds when she shaves.  Is wanting it removed.  Denies that it is tender and denies change is size, shape, or color.     Patient's PMR from outside medical facility reviewed and noted.    Review of Systems   HENT:  Positive for sinus pressure and sinus pain. Negative for congestion, ear pain and sore throat.    Respiratory:  Negative for cough and shortness of breath.    Neurological:  Positive for headaches.      Otherwise complete ROS reviewed and negative except as mentioned in the HPI.    Past Medical History:   Past Medical History:   Diagnosis Date    Allergic rhinitis     grass, low eggs, cats    COVID-19 12/2021    mild    Disc disease, degenerative, cervical     2 herniated discs    Lactose intolerance     Obesity     Overactive bladder      Past Surgical History:  Past Surgical History:   Procedure Laterality Date    ENDOSCOPY N/A 2/6/2023    Procedure: ESOPHAGOGASTRODUODENOSCOPY WITH ANESTHESIA;  Surgeon: Laura Holloway MD;  Location: [facility] ENDOSCOPY;  Service: Gastroenterology;  Laterality: N/A;  pre: epigastric pain. dysphagia.  post: normal  no PCP    REFRACTIVE SURGERY  04/2022    TONSILLECTOMY  2012     Social History:  reports that she has never smoked. She has been exposed to tobacco smoke. She has never used smokeless tobacco. She reports current alcohol use. She reports that she does not currently use drugs after having used the following drugs: Marijuana.    Family  History: family history includes Alzheimer's disease in her maternal grandfather; Bipolar disorder in her father and sister; Heart failure in her mother; Hypertension in her maternal grandfather, maternal grandmother, and mother; No Known Problems in her half-brother; Obesity in her mother.      Allergies:  Allergies   Allergen Reactions    Contrast Dye (Echo Or Unknown Ct/Mr) Anaphylaxis     Medications:  Prior to Admission medications    Medication Sig Start Date End Date Taking? Authorizing Provider   azelastine (ASTELIN) 0.1 % nasal spray 2 sprays into the nostril(s) as directed by provider 2 (Two) Times a Day. Use in each nostril as directed 8/21/23  Yes Isabel Potts APRN   cholecalciferol (VITAMIN D3) 250 MCG (13538 UT) capsule Take 1 capsule by mouth 1 (One) Time Per Week. 12/12/22 12/12/23 Yes Isabel Potts APRN   Echinacea 650 MG capsule Take 650 mg by mouth Daily.   Yes Lynette Garcia MD   Homeopathic Products (ZICAM COLD REMEDY PO) Take  by mouth As Needed.   Yes Lynette Garcia MD   ibuprofen (ADVIL,MOTRIN) 200 MG tablet Take 3 tablets by mouth As Needed. For chronic neck pain - herniated discs - several times a week for 15 years - trying to use less frequently   Yes Lynette Garcia MD   Loratadine (CLARITIN PO) Take  by mouth Daily. Alternates with generic zyrtec about every 6 months   Yes Lynette Garcia MD   omeprazole (priLOSEC) 40 MG capsule TAKE 1 CAPSULE BY MOUTH EVERY DAY 2/13/23  Yes Isabel Potts APRN   oxybutynin XL (Ditropan XL) 5 MG 24 hr tablet Take 1 tablet by mouth Daily. 7/31/23  Yes Isabel Potts APRN   tolterodine LA (DETROL LA) 4 MG 24 hr capsule Take 1 capsule by mouth Daily. 7/31/23  Yes Isabel Potts APRN   Turmeric 500 MG capsule Take 1 capsule by mouth Daily. 6/1/22  Yes Lynette Garcia MD       PHQ-9 Depression Screening  Little interest or pleasure in doing things? 0-->not at all  "  Feeling down, depressed, or hopeless? 0-->not at all   Trouble falling or staying asleep, or sleeping too much?     Feeling tired or having little energy?     Poor appetite or overeating?     Feeling bad about yourself - or that you are a failure or have let yourself or your family down?     Trouble concentrating on things, such as reading the newspaper or watching television?     Moving or speaking so slowly that other people could have noticed? Or the opposite - being so fidgety or restless that you have been moving around a lot more than usual?     Thoughts that you would be better off dead, or of hurting yourself in some way?     PHQ-9 Total Score 0   If you checked off any problems, how difficult have these problems made it for you to do your work, take care of things at home, or get along with other people?         PHQ-9 Total Score: 0   0 (Negative screening for depression)  Support given, observe for worsening symptoms    Objective     Vital Signs: /71 (BP Location: Right arm, Patient Position: Sitting, Cuff Size: Adult)   Pulse 87   Temp 98 øF (36.7 øC) (Infrared)   Resp 18   Ht 165.1 cm (65\")   Wt 104 kg (229 lb)   SpO2 98%   BMI 38.11 kg/mý   Physical Exam  Vitals and nursing note reviewed.   Constitutional:       Appearance: She is obese.   HENT:      Head: Normocephalic.      Right Ear: Tympanic membrane normal.      Left Ear: Tympanic membrane normal.      Nose: Nose normal.      Left Sinus: Maxillary sinus tenderness and frontal sinus tenderness present.      Mouth/Throat:      Mouth: Mucous membranes are moist.   Eyes:      Conjunctiva/sclera: Conjunctivae normal.   Cardiovascular:      Rate and Rhythm: Normal rate and regular rhythm.      Pulses: Normal pulses.      Heart sounds: Normal heart sounds.   Pulmonary:      Effort: Pulmonary effort is normal.      Breath sounds: Normal breath sounds.   Musculoskeletal:         General: Normal range of motion.      Cervical back: Normal " range of motion.   Skin:     General: Skin is warm and dry.   Neurological:      General: No focal deficit present.      Mental Status: She is alert and oriented to person, place, and time.   Psychiatric:         Mood and Affect: Mood normal.         Behavior: Behavior normal.         Results Reviewed:  Glucose   Date Value Ref Range Status   11/17/2022 97 70 - 99 mg/dL Final     BUN   Date Value Ref Range Status   11/17/2022 14 6 - 20 mg/dL Final     Creatinine   Date Value Ref Range Status   11/17/2022 0.73 0.57 - 1.00 mg/dL Final     Sodium   Date Value Ref Range Status   11/17/2022 139 134 - 144 mmol/L Final     Potassium   Date Value Ref Range Status   11/17/2022 4.1 3.5 - 5.2 mmol/L Final     Chloride   Date Value Ref Range Status   11/17/2022 102 96 - 106 mmol/L Final     Total CO2   Date Value Ref Range Status   11/17/2022 23 20 - 29 mmol/L Final     Calcium   Date Value Ref Range Status   11/17/2022 9.3 8.7 - 10.2 mg/dL Final     ALT (SGPT)   Date Value Ref Range Status   11/17/2022 10 0 - 32 IU/L Final     AST (SGOT)   Date Value Ref Range Status   11/17/2022 16 0 - 40 IU/L Final     WBC   Date Value Ref Range Status   11/17/2022 6.3 3.4 - 10.8 x10E3/uL Final     Hematocrit   Date Value Ref Range Status   11/17/2022 41.4 34.0 - 46.6 % Final     Platelets   Date Value Ref Range Status   11/17/2022 241 150 - 450 x10E3/uL Final         Assessment / Plan     Assessment/Plan     Diagnoses and all orders for this visit:    1. Recurrent sinusitis (Primary)  -     cefdinir (OMNICEF) 300 MG capsule; Take 1 capsule by mouth 2 (Two) Times a Day for 7 days.  Dispense: 14 capsule; Refill: 0    2. Benign mole  -     Ambulatory Referral to Dermatology    3. Class 2 obesity due to excess calories without serious comorbidity in adult, unspecified BMI         An After Visit Summary was printed and given to the patient at discharge.  Return if symptoms worsen or fail to improve.    I have discussed the patient results/orders  and plan/recommendation with them at today's visit.      Isabel Potts, APRN   08/25/2023

## 2023-09-08 ENCOUNTER — TELEPHONE (OUTPATIENT)
Dept: FAMILY MEDICINE CLINIC | Facility: CLINIC | Age: 33
End: 2023-09-08

## 2023-09-08 DIAGNOSIS — J32.9 RECURRENT SINUSITIS: Primary | ICD-10-CM

## 2023-09-08 NOTE — TELEPHONE ENCOUNTER
"  Caller: Jazmin Hunt \"Waleska\"    Relationship: Self    Best call back number: [phone]     What is the best time to reach you: ANYTIME    Who are you requesting to speak with (clinical staff, provider,  specific staff member): CLINICAL      What was the call regarding: PATIENT WANTED TO LET LYUBOV KNOW THAT SHE HAD FINISHED HER SECOND ROUND OF ANTIBIOTICS LAST WEEKEND. FOR A LITTLE BIT SHE WAS BETTER BUT NOW SAME SCENARIO WITH THE SINUS ISSUE IS STARTING ALL OVER AGAIN. PATIENT DOESN'T REALLY WANT TO TRY ANOTHER ROUND OF ANTIBIOTICS. SHE FEELS THAT IT IS TIME TO MOVE FORWARD THERE MUST BE SOMETHING ELSE GOING ON. SHE WOULD LIKE A REFERRAL.    Is it okay if the provider responds through [employer]: NO PLEASE CALL PATIENT        "

## 2023-09-13 ENCOUNTER — OFFICE VISIT (OUTPATIENT)
Dept: FAMILY MEDICINE CLINIC | Facility: CLINIC | Age: 33
End: 2023-09-13
Payer: COMMERCIAL

## 2023-09-13 VITALS
OXYGEN SATURATION: 99 % | DIASTOLIC BLOOD PRESSURE: 78 MMHG | HEART RATE: 71 BPM | SYSTOLIC BLOOD PRESSURE: 121 MMHG | WEIGHT: 227.6 LBS | RESPIRATION RATE: 18 BRPM | TEMPERATURE: 98 F | HEIGHT: 65 IN | BODY MASS INDEX: 37.92 KG/M2

## 2023-09-13 DIAGNOSIS — N32.81 OVERACTIVE BLADDER: Chronic | ICD-10-CM

## 2023-09-13 DIAGNOSIS — Z12.4 ENCOUNTER FOR SCREENING FOR MALIGNANT NEOPLASM OF CERVIX: ICD-10-CM

## 2023-09-13 DIAGNOSIS — J01.01 ACUTE RECURRENT MAXILLARY SINUSITIS: ICD-10-CM

## 2023-09-13 DIAGNOSIS — Z00.01 ENCOUNTER FOR ROUTINE ADULT PHYSICAL EXAM WITH ABNORMAL FINDINGS: Primary | ICD-10-CM

## 2023-09-13 DIAGNOSIS — E66.09 CLASS 2 OBESITY DUE TO EXCESS CALORIES WITHOUT SERIOUS COMORBIDITY IN ADULT, UNSPECIFIED BMI: ICD-10-CM

## 2023-09-13 PROCEDURE — 99395 PREV VISIT EST AGE 18-39: CPT | Performed by: NURSE PRACTITIONER

## 2023-09-13 RX ORDER — DIPHENHYDRAMINE HCL 25 MG
25 CAPSULE ORAL EVERY 6 HOURS PRN
COMMUNITY

## 2023-09-13 RX ORDER — FLUTICASONE PROPIONATE 50 MCG
1 SPRAY, SUSPENSION (ML) NASAL DAILY
Qty: 15.8 ML | Refills: 2 | Status: SHIPPED | OUTPATIENT
Start: 2023-09-13

## 2023-09-13 RX ORDER — OXYBUTYNIN CHLORIDE 5 MG/1
5 TABLET, EXTENDED RELEASE ORAL DAILY
Qty: 30 TABLET | Refills: 5 | Status: SHIPPED | OUTPATIENT
Start: 2023-09-13

## 2023-09-13 RX ORDER — DOXYCYCLINE HYCLATE 100 MG/1
100 CAPSULE ORAL 2 TIMES DAILY
Qty: 20 CAPSULE | Refills: 0 | Status: SHIPPED | OUTPATIENT
Start: 2023-09-13 | End: 2023-09-23

## 2023-09-13 RX ORDER — ECHINACEA PURPUREA EXTRACT 125 MG
1 TABLET ORAL AS NEEDED
COMMUNITY

## 2023-09-13 NOTE — PROGRESS NOTES
Subjective     Chief Complaint   Patient presents with    Annual Exam     With pap       History of Present Illness    Patient presents today for annual physical with pap.  She is needing a refill on oxybutyntin.      She has left sinus pressure.  She states that pressure does improve when she is taking an antibiotic.  She is using saline nasal spray.  ENT appointment in November.     Patient's PMR from outside medical facility reviewed and noted.    Review of Systems   Genitourinary:  Positive for frequency. Negative for dysuria, pelvic pain, vaginal bleeding, vaginal discharge and vaginal pain.      Otherwise complete ROS reviewed and negative except as mentioned in the HPI.    Past Medical History:   Past Medical History:   Diagnosis Date    Allergic rhinitis     grass, low eggs, cats    COVID-19 12/2021    mild    Disc disease, degenerative, cervical     2 herniated discs    Lactose intolerance     Obesity     Overactive bladder      Past Surgical History:  Past Surgical History:   Procedure Laterality Date    ENDOSCOPY N/A 2/6/2023    Procedure: ESOPHAGOGASTRODUODENOSCOPY WITH ANESTHESIA;  Surgeon: Laura Holloway MD;  Location: [facility] ENDOSCOPY;  Service: Gastroenterology;  Laterality: N/A;  pre: epigastric pain. dysphagia.  post: normal  no PCP    REFRACTIVE SURGERY  04/2022    TONSILLECTOMY  2012     Social History:  reports that she has never smoked. She has been exposed to tobacco smoke. She has never used smokeless tobacco. She reports current alcohol use. She reports that she does not currently use drugs after having used the following drugs: Marijuana.    Family History: family history includes Alzheimer's disease in her maternal grandfather; Bipolar disorder in her father and sister; Heart failure in her mother; Hypertension in her maternal grandfather, maternal grandmother, and mother; No Known Problems in her half-brother; Obesity in her mother.      Allergies:  Allergies   Allergen Reactions  "   Contrast Dye (Echo Or Unknown Ct/Mr) Anaphylaxis     Medications:  Prior to Admission medications    Medication Sig Start Date End Date Taking? Authorizing Provider   azelastine (ASTELIN) 0.1 % nasal spray 2 sprays into the nostril(s) as directed by provider 2 (Two) Times a Day. Use in each nostril as directed 8/21/23  Yes Isabel Potts APRN   cholecalciferol (VITAMIN D3) 250 MCG (03821 UT) capsule Take 1 capsule by mouth 1 (One) Time Per Week. 12/12/22 12/12/23 Yes Isabel Potts APRN   Echinacea 650 MG capsule Take 650 mg by mouth Daily.   Yes Lynette Garcia MD   Homeopathic Products (ZICAM COLD REMEDY PO) Take  by mouth As Needed.   Yes Lynette Garcia MD   ibuprofen (ADVIL,MOTRIN) 200 MG tablet Take 3 tablets by mouth As Needed. For chronic neck pain - herniated discs - several times a week for 15 years - trying to use less frequently   Yes Lynette Garcia MD   Loratadine (CLARITIN PO) Take  by mouth Daily. Alternates with generic zyrtec about every 6 months   Yes Lynette Garcia MD   omeprazole (priLOSEC) 40 MG capsule TAKE 1 CAPSULE BY MOUTH EVERY DAY 2/13/23  Yes Isabel Potts APRN   oxybutynin XL (Ditropan XL) 5 MG 24 hr tablet Take 1 tablet by mouth Daily. 7/31/23  Yes Isabel Potts APRN   tolterodine LA (DETROL LA) 4 MG 24 hr capsule Take 1 capsule by mouth Daily. 7/31/23  Yes Isabel Potts APRN   Turmeric 500 MG capsule Take 1 capsule by mouth Daily. 6/1/22  Yes Lynette Garcia MD         Objective     Vital Signs: /78 (BP Location: Right arm, Patient Position: Sitting, Cuff Size: Adult)   Pulse 71   Temp 98 °F (36.7 °C) (Infrared)   Resp 18   Ht 165.1 cm (65\")   Wt 103 kg (227 lb 9.6 oz)   SpO2 99%   BMI 37.87 kg/m²   Physical Exam  Vitals and nursing note reviewed. Exam conducted with a chaperone present.   Constitutional:       Appearance: She is obese.   HENT:      Head: Normocephalic.      Nose: " Nose normal.      Left Sinus: Maxillary sinus tenderness present.      Mouth/Throat:      Mouth: Mucous membranes are moist.   Eyes:      Conjunctiva/sclera: Conjunctivae normal.      Pupils: Pupils are equal, round, and reactive to light.   Cardiovascular:      Rate and Rhythm: Normal rate and regular rhythm.      Pulses: Normal pulses.      Heart sounds: Normal heart sounds.   Pulmonary:      Effort: Pulmonary effort is normal.      Breath sounds: Normal breath sounds.   Chest:   Breasts:     Breasts are symmetrical.      Right: Normal. No swelling, bleeding, inverted nipple, mass, nipple discharge, skin change or tenderness.      Left: Normal. No swelling, bleeding, inverted nipple, mass, nipple discharge, skin change or tenderness.      Comments: Bilateral breast exam performed.  Abdominal:      General: Bowel sounds are normal.      Palpations: Abdomen is soft.   Genitourinary:     General: Normal vulva.      Exam position: Lithotomy position.      Labia:         Right: No rash or tenderness.         Left: No rash or tenderness.       Urethra: No urethral swelling or urethral lesion.      Vagina: Normal.      Cervix: Normal.      Uterus: Normal.       Adnexa: Right adnexa normal and left adnexa normal.      Rectum: Normal.      Comments: Bimanual pelvic exam performed and pap smear obtained.  Unable to palpate adnexa.  No cystocele or rectocele appreciated.    Musculoskeletal:         General: Normal range of motion.      Cervical back: Normal range of motion.   Lymphadenopathy:      Upper Body:      Right upper body: No supraclavicular, axillary or pectoral adenopathy.      Left upper body: No supraclavicular, axillary or pectoral adenopathy.   Skin:     General: Skin is warm and dry.   Neurological:      General: No focal deficit present.      Mental Status: She is alert and oriented to person, place, and time.   Psychiatric:         Mood and Affect: Mood normal.         Behavior: Behavior normal.           Results Reviewed:  Glucose   Date Value Ref Range Status   11/17/2022 97 70 - 99 mg/dL Final     BUN   Date Value Ref Range Status   11/17/2022 14 6 - 20 mg/dL Final     Creatinine   Date Value Ref Range Status   11/17/2022 0.73 0.57 - 1.00 mg/dL Final     Sodium   Date Value Ref Range Status   11/17/2022 139 134 - 144 mmol/L Final     Potassium   Date Value Ref Range Status   11/17/2022 4.1 3.5 - 5.2 mmol/L Final     Chloride   Date Value Ref Range Status   11/17/2022 102 96 - 106 mmol/L Final     Total CO2   Date Value Ref Range Status   11/17/2022 23 20 - 29 mmol/L Final     Calcium   Date Value Ref Range Status   11/17/2022 9.3 8.7 - 10.2 mg/dL Final     ALT (SGPT)   Date Value Ref Range Status   11/17/2022 10 0 - 32 IU/L Final     AST (SGOT)   Date Value Ref Range Status   11/17/2022 16 0 - 40 IU/L Final     WBC   Date Value Ref Range Status   11/17/2022 6.3 3.4 - 10.8 x10E3/uL Final     Hematocrit   Date Value Ref Range Status   11/17/2022 41.4 34.0 - 46.6 % Final     Platelets   Date Value Ref Range Status   11/17/2022 241 150 - 450 x10E3/uL Final         Assessment / Plan     Assessment/Plan     Diagnoses and all orders for this visit:    1. Encounter for routine adult physical exam with abnormal findings (Primary)    2. Encounter for screening for malignant neoplasm of cervix  -     IGP, Rfx Aptima HPV ASCU    3. Overactive bladder  -     oxybutynin XL (Ditropan XL) 5 MG 24 hr tablet; Take 1 tablet by mouth Daily.  Dispense: 30 tablet; Refill: 5    4. Acute recurrent maxillary sinusitis  -     doxycycline (VIBRAMYCIN) 100 MG capsule; Take 1 capsule by mouth 2 (Two) Times a Day for 10 days.  Dispense: 20 capsule; Refill: 0  -     fluticasone (FLONASE) 50 MCG/ACT nasal spray; 1 spray into the nostril(s) as directed by provider Daily.  Dispense: 15.8 mL; Refill: 2    5. Class 2 obesity due to excess calories without serious comorbidity in adult, unspecified BMI         An After Visit Summary was printed and  given to the patient at discharge.  Return for Next scheduled follow up.    I have discussed the patient results/orders and plan/recommendation with them at today's visit.      Isabel Potts, MICHAEL   09/13/2023

## 2023-09-17 LAB
CONV .: NORMAL
CYTOLOGIST CVX/VAG CYTO: NORMAL
CYTOLOGY CVX/VAG DOC CYTO: NORMAL
CYTOLOGY CVX/VAG DOC THIN PREP: NORMAL
DX ICD CODE: NORMAL
HIV 1 & 2 AB SER-IMP: NORMAL
OTHER STN SPEC: NORMAL
STAT OF ADQ CVX/VAG CYTO-IMP: NORMAL

## 2023-10-21 DIAGNOSIS — K21.9 GASTROESOPHAGEAL REFLUX DISEASE WITHOUT ESOPHAGITIS: ICD-10-CM

## 2023-10-23 RX ORDER — OMEPRAZOLE 40 MG/1
CAPSULE, DELAYED RELEASE ORAL
Qty: 30 CAPSULE | Refills: 5 | Status: SHIPPED | OUTPATIENT
Start: 2023-10-23

## 2023-10-23 NOTE — TELEPHONE ENCOUNTER
Pending Prescriptions:                       Disp   Refills    omeprazole (priLOSEC) 40 MG capsule [Pharm*30 cap*5        Sig: TAKE 1 CAPSULE BY MOUTH EVERY DAY

## 2023-10-31 ENCOUNTER — OFFICE VISIT (OUTPATIENT)
Dept: FAMILY MEDICINE CLINIC | Facility: CLINIC | Age: 33
End: 2023-10-31
Payer: COMMERCIAL

## 2023-10-31 VITALS
HEART RATE: 65 BPM | DIASTOLIC BLOOD PRESSURE: 84 MMHG | WEIGHT: 231 LBS | HEIGHT: 65 IN | OXYGEN SATURATION: 98 % | BODY MASS INDEX: 38.49 KG/M2 | SYSTOLIC BLOOD PRESSURE: 117 MMHG | RESPIRATION RATE: 18 BRPM | TEMPERATURE: 98.4 F

## 2023-10-31 DIAGNOSIS — Z23 NEED FOR INFLUENZA VACCINATION: ICD-10-CM

## 2023-10-31 DIAGNOSIS — E66.09 CLASS 2 OBESITY DUE TO EXCESS CALORIES WITH BODY MASS INDEX (BMI) OF 38.0 TO 38.9 IN ADULT, UNSPECIFIED WHETHER SERIOUS COMORBIDITY PRESENT: ICD-10-CM

## 2023-10-31 DIAGNOSIS — N32.81 OVERACTIVE BLADDER: Primary | Chronic | ICD-10-CM

## 2023-10-31 RX ORDER — TOLTERODINE 4 MG/1
4 CAPSULE, EXTENDED RELEASE ORAL DAILY
Qty: 90 CAPSULE | Refills: 3 | Status: SHIPPED | OUTPATIENT
Start: 2023-10-31 | End: 2023-11-09

## 2023-10-31 NOTE — LETTER
[facility]  Vaccine Consent Form    Patient Name:  Jazmin Hunt  Patient :  1990        Screening Checklist  The following questions should be completed prior to vaccination. If you answer “yes” to any question, it does not necessarily mean you should not be vaccinated. It just means we may need to clarify or ask more questions. If a question is unclear, please ask your healthcare provider to explain it.    Yes No   Any fever or moderate to severe illness today (mild illness and/or antibiotic treatment are not contraindications)?     Do you have a history of a serious reaction to any previous vaccinations, such as anaphylaxis, encephalopathy within 7 days, Guillain-[place] syndrome within 6 weeks, seizure?     Have you received any live vaccine(s) in the past month (MMR, SKY)?     Do you have an anaphylactic allergy to latex (DTaP, DTaP-IPV, Hep A, Hep B, MenB, RV, Td, Tdap), baker’s yeast (Hep B, HPV), or gelatin (SKY, MMR)?     Do you have an anaphylactic allergy to neomycin (Rabies, SKY, MMR, IPV, Hep A), polymyxin B (IPV), or streptomycin (IPV)?      Any cancer, leukemia, AIDS, or other immune system disorder? (SKY, MMR, RV)     Do you have a parent, brother, or sister with an immune system problem (if immune competence of vaccine recipient clinically verified, can proceed)? (MMR, SKY)     Any recent steroid treatments for >2 weeks, chemotherapy, or radiation treatment? (SKY, MMR)     Have you received antibody-containing blood transfusions or IVIG in the past 11 months (recommended interval is dependent on product)? (MMR, SKY)     Have you taken antiviral drugs (acyclovir, famciclovir, valacyclovir) in the last 24 or 48 hours, respectively (SKY)?      Are you pregnant or planning to become pregnant within 1 month? (SKY, MMR, HPV, IPV, MenB; For hep B- refer to Engerix-B)     For infants, have you ever been told your child has had intussusception or a medical emergency involving obstruction of the  intestine (RV)? If not for an infant, can skip this question.         *Ordering Physician/APC should be consulted if “yes” is checked by the patient or guardian above.      I have received, read, and understand the Vaccine Information Statement (VIS) for each vaccine ordered above.  I have considered my health status as well as the health status of my close contacts.  I have taken the opportunity to discuss my vaccine questions with my health care provider.   I have requested that the ordered vaccine(s) be given to me.  I understand the benefits and risks of the vaccines.  I understand that I should remain in the clinic for 15 minutes after receiving the vaccine(s).  _________________________________________________________  Signature of Patient or Parent/Legal Guardian ____________________  Date

## 2023-10-31 NOTE — LETTER
[facility]  Vaccine Consent Form    Patient Name:  Jazmin Hunt  Patient :  1990     Vaccine(s) Ordered    Fluzone (or Fluarix & Flulaval for VFC) >6mos        Screening Checklist  The following questions should be completed prior to vaccination. If you answer “yes” to any question, it does not necessarily mean you should not be vaccinated. It just means we may need to clarify or ask more questions. If a question is unclear, please ask your healthcare provider to explain it.    Yes No   Any fever or moderate to severe illness today (mild illness and/or antibiotic treatment are not contraindications)?     Do you have a history of a serious reaction to any previous vaccinations, such as anaphylaxis, encephalopathy within 7 days, Guillain-[place] syndrome within 6 weeks, seizure?     Have you received any live vaccine(s) in the past month (MMR, SKY)?     Do you have an anaphylactic allergy to latex (DTaP, DTaP-IPV, Hep A, Hep B, MenB, RV, Td, Tdap), baker’s yeast (Hep B, HPV), or gelatin (SKY, MMR)?     Do you have an anaphylactic allergy to neomycin (Rabies, SKY, MMR, IPV, Hep A), polymyxin B (IPV), or streptomycin (IPV)?      Any cancer, leukemia, AIDS, or other immune system disorder? (SKY, MMR, RV)     Do you have a parent, brother, or sister with an immune system problem (if immune competence of vaccine recipient clinically verified, can proceed)? (MMR, SKY)     Any recent steroid treatments for >2 weeks, chemotherapy, or radiation treatment? (SKY, MMR)     Have you received antibody-containing blood transfusions or IVIG in the past 11 months (recommended interval is dependent on product)? (MMR, SKY)     Have you taken antiviral drugs (acyclovir, famciclovir, valacyclovir) in the last 24 or 48 hours, respectively (SKY)?      Are you pregnant or planning to become pregnant within 1 month? (SKY, MMR, HPV, IPV, MenB; For hep B- refer to Engerix-B)     For infants, have you ever been told your child has had  intussusception or a medical emergency involving obstruction of the intestine (RV)? If not for an infant, can skip this question.         *Ordering Physician/APC should be consulted if “yes” is checked by the patient or guardian above.      I have received, read, and understand the Vaccine Information Statement (VIS) for each vaccine ordered above.  I have considered my health status as well as the health status of my close contacts.  I have taken the opportunity to discuss my vaccine questions with my health care provider.   I have requested that the ordered vaccine(s) be given to me.  I understand the benefits and risks of the vaccines.  I understand that I should remain in the clinic for 15 minutes after receiving the vaccine(s).  _________________________________________________________  Signature of Patient or Parent/Legal Guardian ____________________  Date

## 2023-10-31 NOTE — PROGRESS NOTES
Subjective     Chief Complaint   Patient presents with    Follow-up     Overactive bladder       History of Present Illness    Patient states that she did not  oxybutynin due to cost.  She has stopped drinking fluids after dinnertime, and is doing her pelvic floor PT exercises and that has helped with nocturnal urination.  She states that she is only urinating once during the night now and that is manageable.      Patient's PMR from outside medical facility reviewed and noted.    Review of Systems   Genitourinary:  Positive for frequency.        Otherwise complete ROS reviewed and negative except as mentioned in the HPI.    Past Medical History:   Past Medical History:   Diagnosis Date    Allergic rhinitis     grass, low eggs, cats    COVID-19 12/2021    mild    Disc disease, degenerative, cervical     2 herniated discs    Lactose intolerance     Obesity     Overactive bladder      Past Surgical History:  Past Surgical History:   Procedure Laterality Date    ENDOSCOPY N/A 2/6/2023    Procedure: ESOPHAGOGASTRODUODENOSCOPY WITH ANESTHESIA;  Surgeon: Laura Holloway MD;  Location: [facility] ENDOSCOPY;  Service: Gastroenterology;  Laterality: N/A;  pre: epigastric pain. dysphagia.  post: normal  no PCP    REFRACTIVE SURGERY  04/2022    TONSILLECTOMY  2012     Social History:  reports that she has never smoked. She has been exposed to tobacco smoke. She has never used smokeless tobacco. She reports current alcohol use. She reports that she does not currently use drugs after having used the following drugs: Marijuana.    Family History: family history includes Alzheimer's disease in her maternal grandfather; Bipolar disorder in her father and sister; Heart failure in her mother; Hypertension in her maternal grandfather, maternal grandmother, and mother; No Known Problems in her half-brother; Obesity in her mother.      Allergies:  Allergies   Allergen Reactions    Contrast Dye (Echo Or Unknown Ct/Mr)  Anaphylaxis     Medications:  Prior to Admission medications    Medication Sig Start Date End Date Taking? Authorizing Provider   azelastine (ASTELIN) 0.1 % nasal spray 2 sprays into the nostril(s) as directed by provider 2 (Two) Times a Day. Use in each nostril as directed 8/21/23  Yes Isabel Potts APRN   cholecalciferol (VITAMIN D3) 250 MCG (68507 UT) capsule Take 1 capsule by mouth 1 (One) Time Per Week. 12/12/22 12/12/23 Yes Isabel Potts APRN   diphenhydrAMINE (BENADRYL) 25 mg capsule Take 1 capsule by mouth Every 6 (Six) Hours As Needed for Itching.   Yes Lynette Garcia MD   Echinacea 650 MG capsule Take 650 mg by mouth Daily.   Yes Lynette Garcia MD   fluticasone (FLONASE) 50 MCG/ACT nasal spray 1 spray into the nostril(s) as directed by provider Daily. 9/13/23  Yes Isabel Potts APRN   Homeopathic Products (ZICAM COLD REMEDY PO) Take  by mouth As Needed.   Yes Lynette Garcia MD   ibuprofen (ADVIL,MOTRIN) 200 MG tablet Take 3 tablets by mouth As Needed. For chronic neck pain - herniated discs - several times a week for 15 years - trying to use less frequently   Yes Lynette Garcia MD   Loratadine (CLARITIN PO) Take  by mouth Daily. Alternates with generic zyrtec about every 6 months   Yes Lynette Garcia MD   omeprazole (priLOSEC) 40 MG capsule TAKE 1 CAPSULE BY MOUTH EVERY DAY 10/23/23  Yes Isabel Potts APRN   oxybutynin XL (Ditropan XL) 5 MG 24 hr tablet Take 1 tablet by mouth Daily. 9/13/23  Yes Isabel Potts APRN   sodium chloride 0.65 % nasal spray 1 spray into the nostril(s) as directed by provider As Needed for Congestion.   Yes Lynette Garcia MD   Turmeric 500 MG capsule Take 1 capsule by mouth Daily. 6/1/22  Yes Lynette Garcia MD       Objective     Vital Signs: /84 (BP Location: Left arm, Patient Position: Sitting, Cuff Size: Adult)   Pulse 65   Temp 98.4 °F (36.9 °C) (Infrared)    "Resp 18   Ht 165.1 cm (65\")   Wt 105 kg (231 lb)   SpO2 98%   BMI 38.44 kg/m²   Physical Exam  Vitals and nursing note reviewed.   Constitutional:       Appearance: She is obese.   HENT:      Head: Normocephalic.      Nose: Nose normal.      Mouth/Throat:      Mouth: Mucous membranes are moist.   Eyes:      Conjunctiva/sclera: Conjunctivae normal.   Cardiovascular:      Rate and Rhythm: Normal rate and regular rhythm.      Pulses: Normal pulses.      Heart sounds: Normal heart sounds.   Pulmonary:      Effort: Pulmonary effort is normal.      Breath sounds: Normal breath sounds.   Musculoskeletal:         General: Normal range of motion.      Cervical back: Normal range of motion.   Skin:     General: Skin is warm and dry.   Neurological:      General: No focal deficit present.      Mental Status: She is alert and oriented to person, place, and time.   Psychiatric:         Mood and Affect: Mood normal.         Behavior: Behavior normal.       Results Reviewed:  Glucose   Date Value Ref Range Status   11/17/2022 97 70 - 99 mg/dL Final     BUN   Date Value Ref Range Status   11/17/2022 14 6 - 20 mg/dL Final     Creatinine   Date Value Ref Range Status   11/17/2022 0.73 0.57 - 1.00 mg/dL Final     Sodium   Date Value Ref Range Status   11/17/2022 139 134 - 144 mmol/L Final     Potassium   Date Value Ref Range Status   11/17/2022 4.1 3.5 - 5.2 mmol/L Final     Chloride   Date Value Ref Range Status   11/17/2022 102 96 - 106 mmol/L Final     Total CO2   Date Value Ref Range Status   11/17/2022 23 20 - 29 mmol/L Final     Calcium   Date Value Ref Range Status   11/17/2022 9.3 8.7 - 10.2 mg/dL Final     ALT (SGPT)   Date Value Ref Range Status   11/17/2022 10 0 - 32 IU/L Final     AST (SGOT)   Date Value Ref Range Status   11/17/2022 16 0 - 40 IU/L Final     WBC   Date Value Ref Range Status   11/17/2022 6.3 3.4 - 10.8 x10E3/uL Final     Hematocrit   Date Value Ref Range Status   11/17/2022 41.4 34.0 - 46.6 % Final "     Platelets   Date Value Ref Range Status   11/17/2022 241 150 - 450 x10E3/uL Final         Assessment / Plan     Assessment/Plan     Diagnoses and all orders for this visit:    1. Overactive bladder (Primary)  Assessment & Plan:  Continue with pelvic floor PT exercises and tolterodine nightly.      Orders:  -     tolterodine LA (Detrol LA) 4 MG 24 hr capsule; Take 1 capsule by mouth Daily.  Dispense: 90 capsule; Refill: 3    2. Class 2 obesity due to excess calories with body mass index (BMI) of 38.0 to 38.9 in adult, unspecified whether serious comorbidity present    3. Need for influenza vaccination  -     Fluzone (or Fluarix & Flulaval for VFC) >6mos         An After Visit Summary was printed and given to the patient at discharge.  Return for Annual physical.    I have discussed the patient results/orders and plan/recommendation with them at today's visit.      Isabel Potts, APRN   10/31/2023

## 2023-11-08 ENCOUNTER — OFFICE VISIT (OUTPATIENT)
Dept: OTOLARYNGOLOGY | Facility: CLINIC | Age: 33
End: 2023-11-08
Payer: COMMERCIAL

## 2023-11-08 ENCOUNTER — TELEPHONE (OUTPATIENT)
Dept: FAMILY MEDICINE CLINIC | Facility: CLINIC | Age: 33
End: 2023-11-08
Payer: COMMERCIAL

## 2023-11-08 VITALS
RESPIRATION RATE: 16 BRPM | SYSTOLIC BLOOD PRESSURE: 126 MMHG | HEIGHT: 65 IN | BODY MASS INDEX: 37.99 KG/M2 | WEIGHT: 228 LBS | HEART RATE: 113 BPM | TEMPERATURE: 98.4 F | DIASTOLIC BLOOD PRESSURE: 94 MMHG

## 2023-11-08 DIAGNOSIS — N32.81 OVERACTIVE BLADDER: Primary | Chronic | ICD-10-CM

## 2023-11-08 DIAGNOSIS — J34.89 SINUS PRESSURE: ICD-10-CM

## 2023-11-08 DIAGNOSIS — J32.9 RECURRENT SINUSITIS: Primary | ICD-10-CM

## 2023-11-08 DIAGNOSIS — J34.89 SINUS PAIN: ICD-10-CM

## 2023-11-08 DIAGNOSIS — R09.81 NASAL CONGESTION: ICD-10-CM

## 2023-11-08 RX ORDER — OXYBUTYNIN CHLORIDE 5 MG/1
5 TABLET ORAL 3 TIMES DAILY
COMMUNITY
End: 2023-11-09

## 2023-11-08 NOTE — TELEPHONE ENCOUNTER
"Caller: Jazmin Hunt \"Waleska\"    Relationship: Self    Best call back number: [phone]    Which medication are you concerned about: tolterodine LA (Detrol LA) 4 MG 24 hr capsule     Who prescribed you this medication: MICHAEL MCARTHUR    When did you start taking this medication: HAS NOT STARTED TAKING IT    What are your concerns: TOO EXPENSIVE, WOULD LIKE GENERIC ALTERNATIVE THAT WAS DISCUSSED AT VISIT.  "

## 2023-11-08 NOTE — PROGRESS NOTES
YOB: 1990  Location: [facility]  Location Address: [address] [place] [address], [facility], [address] [place] [address]  Location Phone: [phone]    Chief Complaint   Patient presents with    Sinus Problem       History of Present Illness  Jazmin Hunt is a 33 y.o. female.  Jazmin Hunt is here for evaluation of ENT complaints. The patient has had problems with recurrent sinusitis, sinus pain, sinus pressure, and nasal congestion left > right. The patient has had mild to moderate symptoms. The symptoms have been present for the last several months. There have been no identified factors that aggravate the symptoms. There have been no factors that have improved the symptoms.    She has been on several rounds of antibiotics without relief. She is also taking benadryl and Claritin daily. She has also tried flonase and astelin in the past for several weeks without relief. She has also been using a neti-pot and saline nasal spray.     Past Medical History:   Diagnosis Date    Allergic rhinitis     grass, low eggs, cats    COVID-19 2021    mild    Disc disease, degenerative, cervical     2 herniated discs    Lactose intolerance     Obesity     Overactive bladder        Past Surgical History:   Procedure Laterality Date    ENDOSCOPY N/A 2023    Procedure: ESOPHAGOGASTRODUODENOSCOPY WITH ANESTHESIA;  Surgeon: Laura Holloway MD;  Location: [facility] ENDOSCOPY;  Service: Gastroenterology;  Laterality: N/A;  pre: epigastric pain. dysphagia.  post: normal  no PCP    REFRACTIVE SURGERY  2022    TONSILLECTOMY         Outpatient Medications Marked as Taking for the 23 encounter (Office Visit) with Srinivasan Cline APRN   Medication Sig Dispense Refill    cholecalciferol (VITAMIN D3) 250 MCG (49406 UT) capsule Take 1 capsule by mouth 1 (One) Time Per Week. 4 capsule 11    diphenhydrAMINE (BENADRYL) 25 mg capsule Take 1 capsule by mouth Every 6 (Six) Hours As Needed for Itching.      Echinacea 650 MG capsule  Take 650 mg by mouth Daily.      ibuprofen (ADVIL,MOTRIN) 200 MG tablet Take 3 tablets by mouth As Needed. For chronic neck pain - herniated discs - several times a week for 15 years - trying to use less frequently      Loratadine (CLARITIN PO) Take  by mouth Daily. Alternates with generic zyrtec about every 6 months      omeprazole (priLOSEC) 40 MG capsule TAKE 1 CAPSULE BY MOUTH EVERY DAY 30 capsule 5    oxybutynin (DITROPAN) 5 MG tablet Take 1 tablet by mouth 3 (Three) Times a Day.      sodium chloride 0.65 % nasal spray 1 spray into the nostril(s) as directed by provider As Needed for Congestion.      Turmeric 500 MG capsule Take 1 capsule by mouth Daily.         Contrast dye (echo or unknown ct/mr)    Family History   Problem Relation Age of Onset    Hypertension Mother     Heart failure Mother     Obesity Mother     Bipolar disorder Father     Bipolar disorder Sister     Hypertension Maternal Grandmother     Hypertension Maternal Grandfather     Alzheimer's disease Maternal Grandfather     No Known Problems Half-Brother     Colon cancer Neg Hx     Colon polyps Neg Hx     Esophageal cancer Neg Hx     Liver cancer Neg Hx     Stomach cancer Neg Hx     Liver disease Neg Hx     Rectal cancer Neg Hx        Social History     Socioeconomic History    Marital status:    Tobacco Use    Smoking status: Never     Passive exposure: Past    Smokeless tobacco: Never    Tobacco comments:     Friends but no family members   Vaping Use    Vaping Use: Never used   Substance and Sexual Activity    Alcohol use: Yes     Comment: Occasionally    Drug use: Not Currently     Types: Marijuana     Comment: tried THC as a teenager    Sexual activity: Defer     Birth control/protection: Vasectomy       Review of Systems   Constitutional: Negative.    HENT:  Positive for congestion, sinus pressure and sinus pain.         Admits recurrent sinusitis   Genitourinary: Negative.    Neurological: Negative.        Vitals:    11/08/23 1404    BP: 126/94   Pulse: 113   Resp: 16   Temp: 98.4 °F (36.9 °C)       Body mass index is 37.94 kg/m².    Objective     Physical Exam  Vitals reviewed.   Constitutional:       Appearance: Normal appearance. She is obese.   HENT:      Head: Normocephalic.      Right Ear: Tympanic membrane, ear canal and external ear normal.      Left Ear: Tympanic membrane, ear canal and external ear normal.      Nose: Nose normal.      Comments: Nasal crusting noted to bilateral nostrils     Mouth/Throat:      Lips: Pink.      Mouth: Mucous membranes are moist.      Pharynx: Oropharynx is clear.      Comments: Tonsils surgically absent  Musculoskeletal:      Cervical back: Full passive range of motion without pain.   Neurological:      Mental Status: She is alert.   Psychiatric:         Behavior: Behavior is cooperative.         Assessment & Plan   Diagnoses and all orders for this visit:    1. Recurrent sinusitis (Primary)    2. Nasal congestion    3. Sinus pressure    4. Sinus pain    Other orders  -     mupirocin (BACTROBAN) 2 % nasal ointment; 1 application  into the nostril(s) as directed by provider 2 (Two) Times a Day for 30 days.  Dispense: 60 g; Refill: 0      * Surgery not found *  No orders of the defined types were placed in this encounter.    Nasal ointment twice daily as directed  Stop zyrtec and benadryl  Start Astelin and Flonase  Patient would like to try medical management before proceeding with sinus CT. Patient to call if she changes her mind.  Return for problems    Return in about 3 months (around 2/8/2024) for Recheck.       Patient Instructions   Nasal ointment twice daily as directed  Stop zyrtec and benadryl  Start Astelin and Flonase  Patient would like to try medical management before proceeding with sinus CT  Return for problems    For the best response, use your nasal sprays every day without skipping doses. It may take several weeks before the full effect is acheived.      CONTACT INFORMATION:  The main  office phone number is [phone]. For emergencies after hours and on weekends, this number will convert over to our answering service and the on call provider will answer. Please try to keep non emergent phone calls/ questions to office hours 9am-5pm Monday through Friday.      Daqi  As an alternative, you can sign up and use the Epic MyChart system for more direct and quicker access for non emergent questions/ problems.  [facility] Daqi allows you to send messages to your doctor, view your test results, renew your prescriptions, schedule appointments, and more. To sign up, go to [employer] and click on the Sign Up Now link in the New User? box. Enter your Daqi Activation Code exactly as it appears below along with the last four digits of your Social Security Number and your Date of Birth () to complete the sign-up process. If you do not sign up before the expiration date, you must request a new code.     Daqi Activation Code: Activation code not generated  Current Daqi Status: Active     If you have questions, you can email [email or web address] or call [phone] to talk to our Daqi staff. Remember, Daqi is NOT to be used for urgent needs. For medical emergencies, dial 911.     IF YOU SMOKE OR USE TOBACCO PLEASE READ THE FOLLOWING:  Why is smoking bad for me?  Smoking increases the risk of heart disease, lung disease, vascular disease, stroke, and cancer. If you smoke, STOP!        IF YOU SMOKE OR USE TOBACCO PLEASE READ THE FOLLOWING:  Why is smoking bad for me?  Smoking increases the risk of heart disease, lung disease, vascular disease, stroke, and cancer. If you smoke, STOP!     For more information:  [employer]uit Now [place]  -QUIT-NOW  [email or web address]

## 2023-11-08 NOTE — PATIENT INSTRUCTIONS
Nasal ointment twice daily as directed  Stop zyrtec and benadryl  Start Astelin and Flonase  Patient would like to try medical management before proceeding with sinus CT  Return for problems    For the best response, use your nasal sprays every day without skipping doses. It may take several weeks before the full effect is acheived.      CONTACT INFORMATION:  The main office phone number is [phone]. For emergencies after hours and on weekends, this number will convert over to our answering service and the on call provider will answer. Please try to keep non emergent phone calls/ questions to office hours 9am-5pm Monday through Friday.      [employer]  As an alternative, you can sign up and use the Epic MyChart system for more direct and quicker access for non emergent questions/ problems.  [employer] allows you to send messages to your doctor, view your test results, renew your prescriptions, schedule appointments, and more. To sign up, go to [employer] and click on the Sign Up Now link in the New User? box. Enter your [employer] Activation Code exactly as it appears below along with the last four digits of your Social Security Number and your Date of Birth () to complete the sign-up process. If you do not sign up before the expiration date, you must request a new code.     [employer] Activation Code: Activation code not generated  Current [employer] Status: Active     If you have questions, you can email [email or web address] or call [phone] to talk to our [employer] staff. Remember, [employer] is NOT to be used for urgent needs. For medical emergencies, dial 911.     IF YOU SMOKE OR USE TOBACCO PLEASE READ THE FOLLOWING:  Why is smoking bad for me?  Smoking increases the risk of heart disease, lung disease, vascular disease, stroke, and cancer. If you smoke, STOP!        IF YOU SMOKE OR USE TOBACCO PLEASE READ THE FOLLOWING:  Why is smoking bad for me?  Smoking increases the risk of  heart disease, lung disease, vascular disease, stroke, and cancer. If you smoke, STOP!     For more information:  [employer]uit Now [place]  1-800-QUIT-NOW  [email or web address]

## 2023-11-08 NOTE — TELEPHONE ENCOUNTER
Called and left voicemail for patient concerning which medication she is taking and advised a call back to let us know.

## 2023-11-09 RX ORDER — OXYBUTYNIN CHLORIDE 5 MG/1
5 TABLET, EXTENDED RELEASE ORAL DAILY
Qty: 90 TABLET | Refills: 3 | Status: SHIPPED | OUTPATIENT
Start: 2023-11-09 | End: 2024-11-03

## 2023-11-09 NOTE — TELEPHONE ENCOUNTER
Patient returned call and states she is taking the Oxybutynin ER (max dose) 5 mg. She is asking for this medication refill qty. 90.

## 2023-11-20 DIAGNOSIS — K21.9 GASTROESOPHAGEAL REFLUX DISEASE WITHOUT ESOPHAGITIS: ICD-10-CM

## 2023-11-20 DIAGNOSIS — N32.81 OVERACTIVE BLADDER: Chronic | ICD-10-CM

## 2023-11-20 RX ORDER — OXYBUTYNIN CHLORIDE 5 MG/1
5 TABLET, EXTENDED RELEASE ORAL DAILY
Qty: 90 TABLET | Refills: 3 | Status: SHIPPED | OUTPATIENT
Start: 2023-11-20 | End: 2024-11-14

## 2023-11-20 RX ORDER — OMEPRAZOLE 40 MG/1
40 CAPSULE, DELAYED RELEASE ORAL DAILY
Qty: 90 CAPSULE | Refills: 3 | Status: SHIPPED | OUTPATIENT
Start: 2023-11-20 | End: 2024-11-14

## 2024-01-23 ENCOUNTER — OFFICE VISIT (OUTPATIENT)
Dept: FAMILY MEDICINE CLINIC | Facility: CLINIC | Age: 34
End: 2024-01-23
Payer: COMMERCIAL

## 2024-01-23 VITALS
OXYGEN SATURATION: 95 % | BODY MASS INDEX: 38.99 KG/M2 | SYSTOLIC BLOOD PRESSURE: 134 MMHG | RESPIRATION RATE: 18 BRPM | HEIGHT: 65 IN | HEART RATE: 76 BPM | DIASTOLIC BLOOD PRESSURE: 87 MMHG | WEIGHT: 234 LBS | TEMPERATURE: 98 F

## 2024-01-23 DIAGNOSIS — J18.9 PNEUMONIA DUE TO INFECTIOUS ORGANISM, UNSPECIFIED LATERALITY, UNSPECIFIED PART OF LUNG: Primary | ICD-10-CM

## 2024-01-23 DIAGNOSIS — E66.09 CLASS 2 OBESITY DUE TO EXCESS CALORIES WITH BODY MASS INDEX (BMI) OF 38.0 TO 38.9 IN ADULT, UNSPECIFIED WHETHER SERIOUS COMORBIDITY PRESENT: Chronic | ICD-10-CM

## 2024-01-23 DIAGNOSIS — R09.89 CHEST CONGESTION: ICD-10-CM

## 2024-01-23 DIAGNOSIS — R05.2 SUBACUTE COUGH: ICD-10-CM

## 2024-01-23 PROCEDURE — 99213 OFFICE O/P EST LOW 20 MIN: CPT | Performed by: NURSE PRACTITIONER

## 2024-01-23 RX ORDER — AZITHROMYCIN 250 MG/1
TABLET, FILM COATED ORAL
Qty: 6 TABLET | Refills: 0 | Status: SHIPPED | OUTPATIENT
Start: 2024-01-23

## 2024-01-23 RX ORDER — AMOXICILLIN AND CLAVULANATE POTASSIUM 875; 125 MG/1; MG/1
1 TABLET, FILM COATED ORAL 2 TIMES DAILY
Qty: 14 TABLET | Refills: 0 | Status: SHIPPED | OUTPATIENT
Start: 2024-01-23 | End: 2024-01-30

## 2024-01-23 NOTE — PROGRESS NOTES
Subjective     Chief Complaint   Patient presents with    Cough    URI       Cough    URI   Associated symptoms include coughing.       Patient presents today with productive cough and rattling in chest that is more in the morning.  This started when she had the flu the week after New Year and then progressed into chest congestion the next week.  She went to Urgent Care last week and was prescribed inhaler and steroids and mucinex D.   She does have heaviness with breathing.      Patient's PMR from outside medical facility reviewed and noted.    Review of Systems   Respiratory:  Positive for cough.         Otherwise complete ROS reviewed and negative except as mentioned in the HPI.    Past Medical History:   Past Medical History:   Diagnosis Date    Allergic rhinitis     grass, low eggs, cats    COVID-19 12/2021    mild    Disc disease, degenerative, cervical     2 herniated discs    Lactose intolerance     Obesity     Overactive bladder      Past Surgical History:  Past Surgical History:   Procedure Laterality Date    ENDOSCOPY N/A 2/6/2023    Procedure: ESOPHAGOGASTRODUODENOSCOPY WITH ANESTHESIA;  Surgeon: Laura Holloway MD;  Location: [facility] ENDOSCOPY;  Service: Gastroenterology;  Laterality: N/A;  pre: epigastric pain. dysphagia.  post: normal  no PCP    REFRACTIVE SURGERY  04/2022    TONSILLECTOMY  2012     Social History:  reports that she has never smoked. She has been exposed to tobacco smoke. She has never used smokeless tobacco. She reports current alcohol use. She reports that she does not currently use drugs after having used the following drugs: Marijuana.    Family History: family history includes Alzheimer's disease in her maternal grandfather; Bipolar disorder in her father and sister; Heart failure in her mother; Hypertension in her maternal grandfather, maternal grandmother, and mother; No Known Problems in her half-brother; Obesity in her mother.      Allergies:  Allergies   Allergen  Reactions    Contrast Dye (Echo Or Unknown Ct/Mr) Anaphylaxis     Medications:  Prior to Admission medications    Medication Sig Start Date End Date Taking? Authorizing Provider   diphenhydrAMINE (BENADRYL) 25 mg capsule Take 1 capsule by mouth Every 6 (Six) Hours As Needed for Itching.   Yes Lynette Garcia MD   Echinacea 650 MG capsule Take 650 mg by mouth Daily.   Yes Lynette Garcia MD   ibuprofen (ADVIL,MOTRIN) 200 MG tablet Take 3 tablets by mouth As Needed. For chronic neck pain - herniated discs - several times a week for 15 years - trying to use less frequently   Yes Lynette Garcia MD   Loratadine (CLARITIN PO) Take  by mouth Daily. Alternates with generic zyrtec about every 6 months   Yes Lynette Garcia MD   omeprazole (priLOSEC) 40 MG capsule Take 1 capsule by mouth Daily for 360 days. 11/20/23 11/14/24 Yes Isabel Potts APRN   oxybutynin XL (DITROPAN-XL) 5 MG 24 hr tablet Take 1 tablet by mouth Daily for 360 days. 11/20/23 11/14/24 Yes Isabel Potts APRN   sodium chloride 0.65 % nasal spray 1 spray into the nostril(s) as directed by provider As Needed for Congestion.   Yes Lynette Garcia MD   Turmeric 500 MG capsule Take 1 capsule by mouth Daily. 6/1/22  Yes Lynette Garcia MD   azelastine (ASTELIN) 0.1 % nasal spray 2 sprays into the nostril(s) as directed by provider 2 (Two) Times a Day. Use in each nostril as directed  Patient not taking: Reported on 11/8/2023 8/21/23   Isabel Potts APRN   fluticasone (FLONASE) 50 MCG/ACT nasal spray 1 spray into the nostril(s) as directed by provider Daily.  Patient not taking: Reported on 11/8/2023 9/13/23   Isabel Potts APRN   Homeopathic Products (ZICAM COLD REMEDY PO) Take  by mouth As Needed.  Patient not taking: Reported on 11/8/2023    Lynette Garcia MD       PHQ-9 Depression Screening  Little interest or pleasure in doing things? 0-->not at all   Feeling down,  "depressed, or hopeless? 0-->not at all   Trouble falling or staying asleep, or sleeping too much?     Feeling tired or having little energy?     Poor appetite or overeating?     Feeling bad about yourself - or that you are a failure or have let yourself or your family down?     Trouble concentrating on things, such as reading the newspaper or watching television?     Moving or speaking so slowly that other people could have noticed? Or the opposite - being so fidgety or restless that you have been moving around a lot more than usual?     Thoughts that you would be better off dead, or of hurting yourself in some way?     PHQ-9 Total Score 0   If you checked off any problems, how difficult have these problems made it for you to do your work, take care of things at home, or get along with other people?         PHQ-9 Total Score: 0   0 (Negative screening for depression)  Support given, observe for worsening symptoms    Objective     Vital Signs: /87 (BP Location: Right arm, Patient Position: Sitting, Cuff Size: Adult)   Pulse 76   Temp 98 °F (36.7 °C) (Infrared)   Resp 18   Ht 165.1 cm (65\")   Wt 106 kg (234 lb)   SpO2 95%   BMI 38.94 kg/m²   Physical Exam  Vitals and nursing note reviewed.   Constitutional:       Appearance: She is obese.   HENT:      Head: Normocephalic.      Right Ear: Tympanic membrane normal.      Left Ear: Tympanic membrane normal.      Nose: Nose normal.      Mouth/Throat:      Mouth: Mucous membranes are moist.   Eyes:      Conjunctiva/sclera: Conjunctivae normal.   Cardiovascular:      Rate and Rhythm: Normal rate and regular rhythm.      Pulses: Normal pulses.      Heart sounds: Normal heart sounds.   Pulmonary:      Effort: Pulmonary effort is normal.      Breath sounds: Examination of the left-upper field reveals rhonchi. Examination of the left-lower field reveals rhonchi. Rhonchi present.   Musculoskeletal:         General: Normal range of motion.      Cervical back: Normal " range of motion.   Skin:     General: Skin is warm and dry.   Neurological:      General: No focal deficit present.      Mental Status: She is alert and oriented to person, place, and time.   Psychiatric:         Mood and Affect: Mood normal.         Behavior: Behavior normal.       Results Reviewed:  Glucose   Date Value Ref Range Status   11/17/2022 97 70 - 99 mg/dL Final     BUN   Date Value Ref Range Status   11/17/2022 14 6 - 20 mg/dL Final     Creatinine   Date Value Ref Range Status   11/17/2022 0.73 0.57 - 1.00 mg/dL Final     Sodium   Date Value Ref Range Status   11/17/2022 139 134 - 144 mmol/L Final     Potassium   Date Value Ref Range Status   11/17/2022 4.1 3.5 - 5.2 mmol/L Final     Chloride   Date Value Ref Range Status   11/17/2022 102 96 - 106 mmol/L Final     Total CO2   Date Value Ref Range Status   11/17/2022 23 20 - 29 mmol/L Final     Calcium   Date Value Ref Range Status   11/17/2022 9.3 8.7 - 10.2 mg/dL Final     ALT (SGPT)   Date Value Ref Range Status   11/17/2022 10 0 - 32 IU/L Final     AST (SGOT)   Date Value Ref Range Status   11/17/2022 16 0 - 40 IU/L Final     WBC   Date Value Ref Range Status   11/17/2022 6.3 3.4 - 10.8 x10E3/uL Final     Hematocrit   Date Value Ref Range Status   11/17/2022 41.4 34.0 - 46.6 % Final     Platelets   Date Value Ref Range Status   11/17/2022 241 150 - 450 x10E3/uL Final         Assessment / Plan     Assessment/Plan     Diagnoses and all orders for this visit:    1. Pneumonia due to infectious organism, unspecified laterality, unspecified part of lung (Primary)  -     azithromycin (Zithromax Z-Lobo) 250 MG tablet; Take 2 tablets by mouth on day 1, then 1 tablet daily on days 2-5  Dispense: 6 tablet; Refill: 0  -     amoxicillin-clavulanate (AUGMENTIN) 875-125 MG per tablet; Take 1 tablet by mouth 2 (Two) Times a Day for 7 days.  Dispense: 14 tablet; Refill: 0    2. Class 2 obesity due to excess calories with body mass index (BMI) of 38.0 to 38.9 in adult,  unspecified whether serious comorbidity present    3. Chest congestion    4. Subacute cough         An After Visit Summary was printed and given to the patient at discharge.  No follow-ups on file.    I have discussed the patient results/orders and plan/recommendation with them at today's visit.      Isabel Potts, APRN   01/23/2024

## 2024-04-29 ENCOUNTER — OFFICE VISIT (OUTPATIENT)
Dept: FAMILY MEDICINE CLINIC | Facility: CLINIC | Age: 34
End: 2024-04-29
Payer: COMMERCIAL

## 2024-04-29 VITALS
BODY MASS INDEX: 38.82 KG/M2 | OXYGEN SATURATION: 98 % | RESPIRATION RATE: 18 BRPM | SYSTOLIC BLOOD PRESSURE: 113 MMHG | HEART RATE: 73 BPM | DIASTOLIC BLOOD PRESSURE: 77 MMHG | HEIGHT: 65 IN | WEIGHT: 233 LBS | TEMPERATURE: 98 F

## 2024-04-29 DIAGNOSIS — E66.09 CLASS 2 OBESITY DUE TO EXCESS CALORIES WITH BODY MASS INDEX (BMI) OF 38.0 TO 38.9 IN ADULT, UNSPECIFIED WHETHER SERIOUS COMORBIDITY PRESENT: Chronic | ICD-10-CM

## 2024-04-29 DIAGNOSIS — J32.9 RECURRENT SINUSITIS: Primary | Chronic | ICD-10-CM

## 2024-04-29 DIAGNOSIS — L30.9 ECZEMA, UNSPECIFIED TYPE: ICD-10-CM

## 2024-04-29 PROCEDURE — 99214 OFFICE O/P EST MOD 30 MIN: CPT | Performed by: NURSE PRACTITIONER

## 2024-04-29 RX ORDER — SODIUM FLUORIDE 6 MG/ML
PASTE, DENTIFRICE DENTAL
COMMUNITY
Start: 2024-02-22

## 2024-04-29 RX ORDER — AZITHROMYCIN 250 MG/1
TABLET, FILM COATED ORAL
Qty: 6 TABLET | Refills: 0 | Status: SHIPPED | OUTPATIENT
Start: 2024-04-29

## 2024-04-29 RX ORDER — TRIAMCINOLONE ACETONIDE 1 MG/G
1 OINTMENT TOPICAL 2 TIMES DAILY
Qty: 80 G | Refills: 0 | Status: SHIPPED | OUTPATIENT
Start: 2024-04-29

## 2024-04-29 RX ORDER — METHYLPREDNISOLONE 4 MG/1
TABLET ORAL
Qty: 21 TABLET | Refills: 0 | Status: SHIPPED | OUTPATIENT
Start: 2024-04-29

## 2024-04-29 NOTE — PROGRESS NOTES
"        Subjective     Chief Complaint   Patient presents with    Sinus Problem       History of Present Illness    Patient presents today with sinus pressure and nasal congestion for 3 weeks.  Her sinus drainage is yellow.  She feels like her \"sinuses are on fire\" and her teeth hurt.  Her sinus pressure is worse when leaning forward.  Her father visited 3 weeks ago and was sick with an URI.  She went to ENT in the fall and opted to delay CT sinuses because her sinus pressure was improving at that time.  She states that she is not using the astelin and flonase nasal sprays for 3 weeks because they are more irritating to her nose.  She is taking benadryl daily.  She is using mupirocin ointment which is helping.      She has 3 dots on her thigh that have been present for 10 days.  She does have a history of eczema.  They are discolored and not itching or scaling.  She is applying eczema lotion with no improvement.      Patient's PMR from outside medical facility reviewed and noted.    Review of Systems     Otherwise complete ROS reviewed and negative except as mentioned in the HPI.    Past Medical History:   Past Medical History:   Diagnosis Date    Allergic rhinitis     grass, low eggs, cats    COVID-19 12/2021    mild    Disc disease, degenerative, cervical     2 herniated discs    Lactose intolerance     Obesity     Overactive bladder      Past Surgical History:  Past Surgical History:   Procedure Laterality Date    ENDOSCOPY N/A 2/6/2023    Procedure: ESOPHAGOGASTRODUODENOSCOPY WITH ANESTHESIA;  Surgeon: Laura Holloway MD;  Location: [facility] ENDOSCOPY;  Service: Gastroenterology;  Laterality: N/A;  pre: epigastric pain. dysphagia.  post: normal  no PCP    REFRACTIVE SURGERY  04/2022    TONSILLECTOMY  2012     Social History:  reports that she has never smoked. She has been exposed to tobacco smoke. She has never used smokeless tobacco. She reports current alcohol use. She reports that she does not currently use " drugs after having used the following drugs: Marijuana.    Family History: family history includes Alzheimer's disease in her maternal grandfather; Bipolar disorder in her father and sister; Heart failure in her mother; Hypertension in her maternal grandfather, maternal grandmother, and mother; No Known Problems in her half-brother; Obesity in her mother.      Allergies:  Allergies   Allergen Reactions    Contrast Dye (Echo Or Unknown Ct/Mr) Anaphylaxis     Medications:  Prior to Admission medications    Medication Sig Start Date End Date Taking? Authorizing Provider   azelastine (ASTELIN) 0.1 % nasal spray 2 sprays into the nostril(s) as directed by provider 2 (Two) Times a Day. Use in each nostril as directed 8/21/23  Yes Isabel Potts APRN   diphenhydrAMINE (BENADRYL) 25 mg capsule Take 1 capsule by mouth Every 6 (Six) Hours As Needed for Itching.   Yes Lynette Garcia MD   Echinacea 650 MG capsule Take 650 mg by mouth Daily.   Yes Lynette Garcia MD   ibuprofen (ADVIL,MOTRIN) 200 MG tablet Take 3 tablets by mouth As Needed. For chronic neck pain - herniated discs - several times a week for 15 years - trying to use less frequently   Yes Lynette Garcia MD   Loratadine (CLARITIN PO) Take  by mouth Daily. Alternates with generic zyrtec about every 6 months   Yes Lynette Garcai MD   omeprazole (priLOSEC) 40 MG capsule Take 1 capsule by mouth Daily for 360 days. 11/20/23 11/14/24 Yes Isabel Potts APRN   oxybutynin XL (DITROPAN-XL) 5 MG 24 hr tablet Take 1 tablet by mouth Daily for 360 days. 11/20/23 11/14/24 Yes Isabel Potts APRN   PreviDent 5000 Booster Plus 1.1 % paste Please see attached for detailed directions 2/22/24  Yes Lynette Garcia MD   sodium chloride 0.65 % nasal spray 1 spray into the nostril(s) as directed by provider As Needed for Congestion.   Yes Lynette Garcia MD   Turmeric 500 MG capsule Take 1 capsule by mouth Daily.  "6/1/22  Yes Lynette Garcia MD   azithromycin (Zithromax Z-Lobo) 250 MG tablet Take 2 tablets by mouth on day 1, then 1 tablet daily on days 2-5  Patient not taking: Reported on 4/29/2024 1/23/24   Isabel Potts APRN   fluticasone (FLONASE) 50 MCG/ACT nasal spray 1 spray into the nostril(s) as directed by provider Daily.  Patient not taking: Reported on 11/8/2023 9/13/23   Isabel Potts APRN   Homeopathic Products (ZICAM COLD REMEDY PO) Take  by mouth As Needed.  Patient not taking: Reported on 11/8/2023    ProviderLynette MD       PHQ-9 Depression Screening  Little interest or pleasure in doing things? 0-->not at all   Feeling down, depressed, or hopeless? 0-->not at all   Trouble falling or staying asleep, or sleeping too much?     Feeling tired or having little energy?     Poor appetite or overeating?     Feeling bad about yourself - or that you are a failure or have let yourself or your family down?     Trouble concentrating on things, such as reading the newspaper or watching television?     Moving or speaking so slowly that other people could have noticed? Or the opposite - being so fidgety or restless that you have been moving around a lot more than usual?     Thoughts that you would be better off dead, or of hurting yourself in some way?     PHQ-9 Total Score 0   If you checked off any problems, how difficult have these problems made it for you to do your work, take care of things at home, or get along with other people?         PHQ-9 Total Score: 0   0 (Negative screening for depression)  Support given, observe for worsening symptoms    Objective     Vital Signs: /77 (BP Location: Left arm, Patient Position: Sitting, Cuff Size: Adult)   Pulse 73   Temp 98 °F (36.7 °C) (Infrared)   Resp 18   Ht 165.1 cm (65\")   Wt 106 kg (233 lb)   SpO2 98%   BMI 38.77 kg/m²   Physical Exam  Vitals and nursing note reviewed.   Constitutional:       Appearance: She is obese. "   HENT:      Head: Normocephalic.      Right Ear: Tympanic membrane normal.      Left Ear: Tympanic membrane normal.      Nose: Congestion present.      Right Turbinates: Swollen.      Left Turbinates: Swollen.      Right Sinus: Maxillary sinus tenderness and frontal sinus tenderness present.      Left Sinus: Maxillary sinus tenderness and frontal sinus tenderness present.      Mouth/Throat:      Mouth: Mucous membranes are moist.   Eyes:      Conjunctiva/sclera: Conjunctivae normal.   Cardiovascular:      Rate and Rhythm: Normal rate and regular rhythm.      Pulses: Normal pulses.      Heart sounds: Normal heart sounds.   Pulmonary:      Effort: Pulmonary effort is normal.      Breath sounds: Normal breath sounds.   Musculoskeletal:         General: Normal range of motion.      Cervical back: Normal range of motion.   Skin:     General: Skin is warm and dry.      Findings: Rash present. Rash is scaling.          Neurological:      General: No focal deficit present.      Mental Status: She is alert and oriented to person, place, and time.   Psychiatric:         Mood and Affect: Mood normal.         Behavior: Behavior normal.            Results Reviewed:  Glucose   Date Value Ref Range Status   11/17/2022 97 70 - 99 mg/dL Final     BUN   Date Value Ref Range Status   11/17/2022 14 6 - 20 mg/dL Final     Creatinine   Date Value Ref Range Status   11/17/2022 0.73 0.57 - 1.00 mg/dL Final     Sodium   Date Value Ref Range Status   11/17/2022 139 134 - 144 mmol/L Final     Potassium   Date Value Ref Range Status   11/17/2022 4.1 3.5 - 5.2 mmol/L Final     Chloride   Date Value Ref Range Status   11/17/2022 102 96 - 106 mmol/L Final     Total CO2   Date Value Ref Range Status   11/17/2022 23 20 - 29 mmol/L Final     Calcium   Date Value Ref Range Status   11/17/2022 9.3 8.7 - 10.2 mg/dL Final     ALT (SGPT)   Date Value Ref Range Status   11/17/2022 10 0 - 32 IU/L Final     AST (SGOT)   Date Value Ref Range Status    11/17/2022 16 0 - 40 IU/L Final     WBC   Date Value Ref Range Status   11/17/2022 6.3 3.4 - 10.8 x10E3/uL Final     Hematocrit   Date Value Ref Range Status   11/17/2022 41.4 34.0 - 46.6 % Final     Platelets   Date Value Ref Range Status   11/17/2022 241 150 - 450 x10E3/uL Final         Assessment / Plan     Assessment/Plan     Diagnoses and all orders for this visit:    1. Recurrent sinusitis (Primary)  Comments:  Patient to follow up with ENT.  Orders:  -     azithromycin (Zithromax Z-Lobo) 250 MG tablet; Take 2 tablets by mouth on day 1, then 1 tablet daily on days 2-5  Dispense: 6 tablet; Refill: 0  -     CT Sinus Without Contrast; Future  -     methylPREDNISolone (MEDROL) 4 MG dose pack; Take as directed on package instructions.  Dispense: 21 tablet; Refill: 0    2. Class 2 obesity due to excess calories with body mass index (BMI) of 38.0 to 38.9 in adult, unspecified whether serious comorbidity present    3. Eczema, unspecified type  -     triamcinolone (KENALOG) 0.1 % ointment; Apply 1 Application topically to the appropriate area as directed 2 (Two) Times a Day.  Dispense: 80 g; Refill: 0         An After Visit Summary was printed and given to the patient at discharge.  Return if symptoms worsen or fail to improve.    I have discussed the patient results/orders and plan/recommendation with them at today's visit.      Isabel Potts, MICHAEL   04/29/2024

## 2024-05-07 ENCOUNTER — TELEPHONE (OUTPATIENT)
Dept: FAMILY MEDICINE CLINIC | Facility: CLINIC | Age: 34
End: 2024-05-07
Payer: COMMERCIAL

## 2024-05-08 ENCOUNTER — OFFICE VISIT (OUTPATIENT)
Dept: FAMILY MEDICINE CLINIC | Facility: CLINIC | Age: 34
End: 2024-05-08
Payer: COMMERCIAL

## 2024-05-08 VITALS
WEIGHT: 228 LBS | HEIGHT: 65 IN | OXYGEN SATURATION: 97 % | SYSTOLIC BLOOD PRESSURE: 134 MMHG | TEMPERATURE: 98.2 F | BODY MASS INDEX: 37.99 KG/M2 | DIASTOLIC BLOOD PRESSURE: 84 MMHG | RESPIRATION RATE: 18 BRPM | HEART RATE: 74 BPM

## 2024-05-08 DIAGNOSIS — R09.81 NASAL CONGESTION: ICD-10-CM

## 2024-05-08 DIAGNOSIS — R42 DIZZINESS: ICD-10-CM

## 2024-05-08 DIAGNOSIS — E66.09 CLASS 2 OBESITY DUE TO EXCESS CALORIES WITHOUT SERIOUS COMORBIDITY WITH BODY MASS INDEX (BMI) OF 37.0 TO 37.9 IN ADULT: ICD-10-CM

## 2024-05-08 DIAGNOSIS — J32.9 RECURRENT SINUSITIS: Primary | ICD-10-CM

## 2024-05-08 DIAGNOSIS — H65.193 ACUTE MEE (MIDDLE EAR EFFUSION), BILATERAL: ICD-10-CM

## 2024-05-08 PROCEDURE — 99214 OFFICE O/P EST MOD 30 MIN: CPT | Performed by: NURSE PRACTITIONER

## 2024-05-08 NOTE — PROGRESS NOTES
Subjective     Chief Complaint   Patient presents with    Dizziness    Headache       Dizziness  Associated symptoms include headaches and a sore throat.   Headache    Patient presents today with right sinusitis and headache and a nasal congestion.  She states that symptoms have not improved with treatment.  She did have dizziness that lasted 5 seconds last Friday and denies any dizziness since.  She was treated with zpak and medrol dose heber on 4/29/24 and CT sinus is scheduled for Friday.  She states that she has a dry scratchy throat, feels short of breath.      Patient's PMR from outside medical facility reviewed and noted.    Review of Systems   HENT:  Positive for sore throat.    Respiratory:  Positive for shortness of breath.    Neurological:  Positive for dizziness and headaches.        Otherwise complete ROS reviewed and negative except as mentioned in the HPI.    Past Medical History:   Past Medical History:   Diagnosis Date    Allergic rhinitis     grass, low eggs, cats    COVID-19 12/2021    mild    Disc disease, degenerative, cervical     2 herniated discs    Lactose intolerance     Obesity     Overactive bladder      Past Surgical History:  Past Surgical History:   Procedure Laterality Date    ENDOSCOPY N/A 2/6/2023    Procedure: ESOPHAGOGASTRODUODENOSCOPY WITH ANESTHESIA;  Surgeon: Laura Holloway MD;  Location: [facility] ENDOSCOPY;  Service: Gastroenterology;  Laterality: N/A;  pre: epigastric pain. dysphagia.  post: normal  no PCP    REFRACTIVE SURGERY  04/2022    TONSILLECTOMY  2012     Social History:  reports that she has never smoked. She has been exposed to tobacco smoke. She has never used smokeless tobacco. She reports current alcohol use. She reports that she does not currently use drugs after having used the following drugs: Marijuana.    Family History: family history includes Alzheimer's disease in her maternal grandfather; Bipolar disorder in her father and sister; Heart failure in  her mother; Hypertension in her maternal grandfather, maternal grandmother, and mother; No Known Problems in her half-brother; Obesity in her mother.      Allergies:  Allergies   Allergen Reactions    Contrast Dye (Echo Or Unknown Ct/Mr) Anaphylaxis     Medications:  Prior to Admission medications    Medication Sig Start Date End Date Taking? Authorizing Provider   azelastine (ASTELIN) 0.1 % nasal spray 2 sprays into the nostril(s) as directed by provider 2 (Two) Times a Day. Use in each nostril as directed 8/21/23  Yes Isabel Potts APRN   diphenhydrAMINE (BENADRYL) 25 mg capsule Take 1 capsule by mouth Every 6 (Six) Hours As Needed for Itching.   Yes Lynette Garcia MD   Echinacea 650 MG capsule Take 650 mg by mouth Daily.   Yes Lynette Garcia MD   ibuprofen (ADVIL,MOTRIN) 200 MG tablet Take 3 tablets by mouth As Needed. For chronic neck pain - herniated discs - several times a week for 15 years - trying to use less frequently   Yes Lynette Garcia MD   Loratadine (CLARITIN PO) Take  by mouth Daily. Alternates with generic zyrtec about every 6 months   Yes Lynette Garcia MD   omeprazole (priLOSEC) 40 MG capsule Take 1 capsule by mouth Daily for 360 days. 11/20/23 11/14/24 Yes Isabel Potts APRN   oxybutynin XL (DITROPAN-XL) 5 MG 24 hr tablet Take 1 tablet by mouth Daily for 360 days. 11/20/23 11/14/24 Yes Isabel Potts APRN   PreviDent 5000 Booster Plus 1.1 % paste Please see attached for detailed directions 2/22/24  Yes Lynette Garcia MD   sodium chloride 0.65 % nasal spray 1 spray into the nostril(s) as directed by provider As Needed for Congestion.   Yes Lynette Garcia MD   triamcinolone (KENALOG) 0.1 % ointment Apply 1 Application topically to the appropriate area as directed 2 (Two) Times a Day. 4/29/24  Yes Isabel Potts APRN   Turmeric 500 MG capsule Take 1 capsule by mouth Daily. 6/1/22  Yes Lynette Garcia MD  "  azithromycin (Zithromax Z-Lobo) 250 MG tablet Take 2 tablets by mouth on day 1, then 1 tablet daily on days 2-5  Patient not taking: Reported on 5/8/2024 4/29/24   Isabel Potts APRN   fluticasone (FLONASE) 50 MCG/ACT nasal spray 1 spray into the nostril(s) as directed by provider Daily.  Patient not taking: Reported on 11/8/2023 9/13/23   Isabel Potts APRN   Homeopathic Products (ZICAM COLD REMEDY PO) Take  by mouth As Needed.  Patient not taking: Reported on 11/8/2023    Provider, MD Lynette   methylPREDNISolone (MEDROL) 4 MG dose pack Take as directed on package instructions.  Patient not taking: Reported on 5/8/2024 4/29/24   Isabel Potts APRN       PHQ-9 Depression Screening  Little interest or pleasure in doing things? 0-->not at all   Feeling down, depressed, or hopeless? 0-->not at all   Trouble falling or staying asleep, or sleeping too much?     Feeling tired or having little energy?     Poor appetite or overeating?     Feeling bad about yourself - or that you are a failure or have let yourself or your family down?     Trouble concentrating on things, such as reading the newspaper or watching television?     Moving or speaking so slowly that other people could have noticed? Or the opposite - being so fidgety or restless that you have been moving around a lot more than usual?     Thoughts that you would be better off dead, or of hurting yourself in some way?     PHQ-9 Total Score 0   If you checked off any problems, how difficult have these problems made it for you to do your work, take care of things at home, or get along with other people?         PHQ-9 Total Score: 0   0 (Negative screening for depression)  Support given, observe for worsening symptoms    Objective     Vital Signs: /84 (BP Location: Left arm, Patient Position: Sitting, Cuff Size: Adult)   Pulse 74   Temp 98.2 °F (36.8 °C) (Infrared)   Resp 18   Ht 165.1 cm (65\")   Wt 103 kg (228 lb)  "  SpO2 97%   BMI 37.94 kg/m²   Physical Exam  Vitals and nursing note reviewed.   Constitutional:       Appearance: She is obese.   HENT:      Head: Normocephalic.      Right Ear: A middle ear effusion is present.      Left Ear: A middle ear effusion is present.      Nose: Nose normal.      Right Sinus: Maxillary sinus tenderness and frontal sinus tenderness present.      Left Sinus: Maxillary sinus tenderness and frontal sinus tenderness present.      Mouth/Throat:      Mouth: Mucous membranes are moist.   Eyes:      Conjunctiva/sclera: Conjunctivae normal.   Cardiovascular:      Rate and Rhythm: Normal rate and regular rhythm.      Pulses: Normal pulses.      Heart sounds: Normal heart sounds.   Pulmonary:      Effort: Pulmonary effort is normal.      Breath sounds: Normal breath sounds.   Abdominal:      General: Bowel sounds are normal.      Palpations: Abdomen is soft.   Musculoskeletal:         General: Normal range of motion.      Cervical back: Normal range of motion.   Skin:     General: Skin is warm and dry.   Neurological:      General: No focal deficit present.      Mental Status: She is alert and oriented to person, place, and time.   Psychiatric:         Mood and Affect: Mood normal.         Behavior: Behavior normal.       Results Reviewed:  Glucose   Date Value Ref Range Status   11/17/2022 97 70 - 99 mg/dL Final     BUN   Date Value Ref Range Status   11/17/2022 14 6 - 20 mg/dL Final     Creatinine   Date Value Ref Range Status   11/17/2022 0.73 0.57 - 1.00 mg/dL Final     Sodium   Date Value Ref Range Status   11/17/2022 139 134 - 144 mmol/L Final     Potassium   Date Value Ref Range Status   11/17/2022 4.1 3.5 - 5.2 mmol/L Final     Chloride   Date Value Ref Range Status   11/17/2022 102 96 - 106 mmol/L Final     Total CO2   Date Value Ref Range Status   11/17/2022 23 20 - 29 mmol/L Final     Calcium   Date Value Ref Range Status   11/17/2022 9.3 8.7 - 10.2 mg/dL Final     ALT (SGPT)   Date Value  Ref Range Status   11/17/2022 10 0 - 32 IU/L Final     AST (SGOT)   Date Value Ref Range Status   11/17/2022 16 0 - 40 IU/L Final     WBC   Date Value Ref Range Status   11/17/2022 6.3 3.4 - 10.8 x10E3/uL Final     Hematocrit   Date Value Ref Range Status   11/17/2022 41.4 34.0 - 46.6 % Final     Platelets   Date Value Ref Range Status   11/17/2022 241 150 - 450 x10E3/uL Final         Assessment / Plan     Assessment/Plan     Diagnoses and all orders for this visit:    1. Recurrent sinusitis (Primary)    2. Nasal congestion    3. Acute KRISTIN (middle ear effusion), bilateral    4. Dizziness    5. Class 2 obesity due to excess calories without serious comorbidity with body mass index (BMI) of 37.0 to 37.9 in adult      Continue with CT sinuses ordered for later this week.  Pending results of CT will consider ENT referral.  Continue with daily use of Claritin and Astelin nasal spray for treatment of middle ear effusion.  Dizziness likely secondary to bilateral middle ear effusion.       An After Visit Summary was printed and given to the patient at discharge.  No follow-ups on file.    I have discussed the patient results/orders and plan/recommendation with them at today's visit.      Isabel Potts, APRN   05/08/2024

## 2024-05-10 ENCOUNTER — HOSPITAL ENCOUNTER (OUTPATIENT)
Dept: CT IMAGING | Facility: HOSPITAL | Age: 34
Discharge: HOME | End: 2024-05-10
Admitting: NURSE PRACTITIONER
Payer: COMMERCIAL

## 2024-05-10 DIAGNOSIS — J32.9 RECURRENT SINUSITIS: Chronic | ICD-10-CM

## 2024-05-10 DIAGNOSIS — J34.89 NASAL SEPTAL SPUR: ICD-10-CM

## 2024-05-10 DIAGNOSIS — J34.2 DEVIATED SEPTUM: ICD-10-CM

## 2024-05-10 DIAGNOSIS — J34.1 MUCOUS RETENTION CYST OF MAXILLARY SINUS: Primary | ICD-10-CM

## 2024-05-10 PROCEDURE — 70486 CT MAXILLOFACIAL W/O DYE: CPT

## 2024-05-13 ENCOUNTER — TELEPHONE (OUTPATIENT)
Dept: FAMILY MEDICINE CLINIC | Facility: CLINIC | Age: 34
End: 2024-05-13

## 2024-05-13 NOTE — TELEPHONE ENCOUNTER
"  Caller: Jazmin Hunt \"Waleska\"    Relationship: Self    Best call back number: [phone]     Who are you requesting to speak with (clinical staff, provider,  specific staff member): CLINICAL STAFF    What was the call regarding: PATIENT REQUESTING CALLBACK REGARDING TEST RESULTS.      "

## 2024-05-14 NOTE — TELEPHONE ENCOUNTER
Spoke with patient for results, asked to speak with ivelisse for further clarification. Message sent to ivelisse asking her to call patient back.

## 2024-05-15 PROBLEM — J32.9 RECURRENT SINUSITIS: Status: ACTIVE | Noted: 2024-05-15

## 2024-05-30 ENCOUNTER — PATIENT MESSAGE (OUTPATIENT)
Dept: FAMILY MEDICINE CLINIC | Facility: CLINIC | Age: 34
End: 2024-05-30
Payer: COMMERCIAL

## 2024-05-30 DIAGNOSIS — B35.4 TINEA CORPORIS: Primary | ICD-10-CM

## 2024-05-31 RX ORDER — KETOCONAZOLE 20 MG/G
1 CREAM TOPICAL DAILY
Qty: 14 G | Refills: 0 | Status: SHIPPED | OUTPATIENT
Start: 2024-05-31 | End: 2024-06-14

## 2024-06-06 ENCOUNTER — OFFICE VISIT (OUTPATIENT)
Dept: OTOLARYNGOLOGY | Facility: CLINIC | Age: 34
End: 2024-06-06
Payer: COMMERCIAL

## 2024-06-06 VITALS
WEIGHT: 228 LBS | HEART RATE: 76 BPM | SYSTOLIC BLOOD PRESSURE: 130 MMHG | TEMPERATURE: 98.6 F | BODY MASS INDEX: 37.99 KG/M2 | DIASTOLIC BLOOD PRESSURE: 82 MMHG | HEIGHT: 65 IN

## 2024-06-06 DIAGNOSIS — R44.8 FACIAL PRESSURE: ICD-10-CM

## 2024-06-06 DIAGNOSIS — J32.9 RECURRENT SINUSITIS: ICD-10-CM

## 2024-06-06 DIAGNOSIS — J31.0 CHRONIC RHINITIS: Primary | ICD-10-CM

## 2024-06-06 RX ORDER — FLUTICASONE PROPIONATE 50 MCG
2 SPRAY, SUSPENSION (ML) NASAL DAILY
Qty: 16 G | Refills: 2 | Status: SHIPPED | OUTPATIENT
Start: 2024-06-06

## 2024-06-06 RX ORDER — AZELASTINE 1 MG/ML
2 SPRAY, METERED NASAL 2 TIMES DAILY
Qty: 30 ML | Refills: 2 | Status: SHIPPED | OUTPATIENT
Start: 2024-06-06

## 2024-06-06 RX ORDER — ZINC SULFATE 50(220)MG
220 CAPSULE ORAL DAILY
COMMUNITY

## 2024-06-06 NOTE — PROGRESS NOTES
MICHAEL Loaiza  [facility]  [address], [address]  [place] [address]  Fax [phone]  Phone [phone]      Visit Type: FOLLOW UP   Chief Complaint   Patient presents with    Sinus Problem     MUCOUS RETENTION CYST OF MAXILLARY SINUS/DEVIATED SEPTUM/NASAL SEPTAR SPUR- doing better, had sinus headaches until 2 weeks ago, still a little pressure in sinus           Sinus Problem      Jazmin Hunt is a 34 y.o. female who presents for evaluation of sinus complaints. Symptoms have been present for past few years, seems to be worsening. Admits recurrent sinusitis, sinus pressure and pain, chronic rhinitis with congestion and drainage. Reports about 5 episodes of sinusitis in the last year that she was put on abx for. Currently takes flonase and Claritin somewhat regularly for the last year or so, doesn't not seem to prevent infections but does seem to help with drainage.     Had Tonsillectomy in 2012.   No prior sinus surgery    She did have CT scan of sinuses last month. Reviewed in office with her today.   Past Medical History:   Diagnosis Date    Allergic rhinitis     grass, low eggs, cats    COVID-19 12/2021    mild    Disc disease, degenerative, cervical     2 herniated discs    Lactose intolerance     Obesity     Overactive bladder        Past Surgical History:   Procedure Laterality Date    ENDOSCOPY N/A 2/6/2023    Procedure: ESOPHAGOGASTRODUODENOSCOPY WITH ANESTHESIA;  Surgeon: Laura Holloway MD;  Location: [facility] ENDOSCOPY;  Service: Gastroenterology;  Laterality: N/A;  pre: epigastric pain. dysphagia.  post: normal  no PCP    REFRACTIVE SURGERY  04/2022    TONSILLECTOMY  2012       Family History: Her family history includes Alzheimer's disease in her maternal grandfather; Bipolar disorder in her father and sister; Heart failure in her mother; Hypertension in her maternal grandfather, maternal grandmother, and mother;  No Known Problems in her half-brother; Obesity in her mother.     Social History: She  reports that she has never smoked. She has been exposed to tobacco smoke. She has never used smokeless tobacco. She reports current alcohol use. She reports that she does not currently use drugs after having used the following drugs: Marijuana.    Home Medications:  Echinacea, Sodium Fluoride, Turmeric, azelastine, diphenhydrAMINE, fluticasone, ibuprofen, ketoconazole, omeprazole, oxybutynin XL, sodium chloride, triamcinolone, and zinc sulfate    Allergies:  She is allergic to contrast dye (echo or unknown ct/mr).       Vital Signs:   Temp:  [98.6 °F (37 °C)] 98.6 °F (37 °C)  Heart Rate:  [76] 76  BP: (130)/(82) 130/82  ENT Physical Exam  Constitutional  Appearance: patient appears well-developed, well-nourished and well-groomed,  Communication/Voice: communication appropriate for developmental age; vocal quality normal;  Head and Face  Appearance: head appears normal, face appears normal and face appears atraumatic;  Palpation: facial palpation normal;  Salivary: glands normal;  Ear  Hearing: intact;  Auricles: bilateral auricles normal;  External Mastoids: bilateral external mastoids normal;  Ear Canals: bilateral ear canals normal;  Tympanic Membranes: bilateral tympanic membranes normal;  Nose  External Nose: nares patent bilaterally; external nose normal; nasal discharge not visible;  Internal Nose: bilateral intranasal mucosa erythematous; nasal septal deviation present; septal deviation is mild; Septum comments: left to right deviaiton, widened septum   bilateral inferior turbinates erythematous;  Oral Cavity/Oropharynx  Lips: normal;  Teeth: normal;  Gums: gingiva normal;  Tongue: normal;  Oral mucosa: normal;  Hard palate: normal;  Soft palate: normal;  Tonsils: normal;  Base of Tongue: normal;  Posterior pharyngeal wall: normal;  Neck  Neck: neck normal; neck palpation normal;  Respiratory  Inspection: breathing  unlabored; normal breathing rate;  Cardiovascular  Inspection: extremities are warm and well perfused;  Neurovestibular  Mental Status: alert and oriented;           Result Review       RESULTS REVIEW    I have reviewed the patients old records in the chart.   The following results/records were reviewed:     CT Sinus Without Contrast (05/10/2024 15:22)          Assessment & Plan  Chronic rhinitis    Facial pressure    Recurrent sinusitis           New Medications Ordered This Visit   Medications    azelastine (ASTELIN) 0.1 % nasal spray     Si sprays into the nostril(s) as directed by provider 2 (Two) Times a Day. Use in each nostril as directed     Dispense:  30 mL     Refill:  2    fluticasone (FLONASE) 50 MCG/ACT nasal spray     Si sprays into the nostril(s) as directed by provider Daily.     Dispense:  16 g     Refill:  2         Medical and surgical options were discussed including observation, continued medical management, medication modification, and surgical management. Risks, benefits and alternatives were discussed and questions were answered. After considering the options, the patient decided to proceed with medication modification.  Continue nasal sprays as previously prescribed.  Saline nasal spray or saline gel to nose three times a day and as needed. Use a bedside humidifier at night. Place Vasoline in nose in the morning and at night.  Place Vasoline in nose in the morning and at night on the center part of the nose (septum) at least 15 minutes prior to the nasal spray. When using the nasal spray, aim towards the outer corner of the eye.   For the best response, use your nasal sprays every day without skipping doses. It may take several weeks before the full effect is acheived.   GENERAL ALLERGY AVOIDANCE: Regular vacuum cleaning is  recommended  to prevent accumulation of allergens. Use adequate filtration, including double thickness bags or HEPA filters on the  outlet. Use  air-conditioning where possible. Change central air filters with an allergy rated filter on a regular basis. Install car pollen filters where possible.   Start flonase daily  Start astelin spray daily  Saline spray  Plan for nasal endoscopy at follow up  Pending progression may consider allergy testing    Call with questions or concerns    Return in about 6 weeks (around 7/18/2024) for Recheck with nasal scope.        Electronically signed by MICHAEL Loaiza 06/06/24 3:08 PM CDT.

## 2024-06-06 NOTE — PATIENT INSTRUCTIONS
>NASAL STEROID use:  Using nasal steroids:  You will be prescribed one of the following nasal steroids: Flonase, Nasacort, Nasonex, Rhinocort, Qnasl, Zetonna  2 puffs each nostril 2 times daily  Start as soon as possible  If you are using Afrin for 3 days with the nasal steroid,  Use Afrin first and wait 10 minutes to allow the nose to open. Then administer nasal steroids.     >NASAL SALINE:  Use 2 puffs each nostril 4-6 times daily and more frequently if possible.  You can buy saline spray or you can make your own and use an old spray bottle to administer  Use a humidifier at bedside  Recipe for saline:  Water                                 1 quart  Salt (table)                        1 tablespoon  Gylcerin (or Ursula Syrup)    1 teaspoon  Sodium bicarbonate           1 teaspoon  Sprays or Charmaine pots are recommended    Do not allow to stand for more than 24 hrs. Make new solution. There is no preservative in this solution.    Sinus irrigation and saline application can be enhanced with various over-the-counter products.  A WaterPik can be quite useful to irrigate, especially following sinus surgery.  Navage makes a product that irrigates the nose and some of the sinuses.  NeilMed makes a Sinu-Gator to irrigate the sinuses.  Neomed also has canned saline that we will come out under pressure.  A Charmaine pot can also be helpful.  All of these products help keep the nose clear of debris.  Please use as directed on the instructions that come with the particular device.

## 2024-08-13 ENCOUNTER — OFFICE VISIT (OUTPATIENT)
Dept: OTOLARYNGOLOGY | Facility: CLINIC | Age: 34
End: 2024-08-13
Payer: COMMERCIAL

## 2024-08-13 VITALS
HEART RATE: 61 BPM | DIASTOLIC BLOOD PRESSURE: 85 MMHG | BODY MASS INDEX: 38.65 KG/M2 | SYSTOLIC BLOOD PRESSURE: 121 MMHG | TEMPERATURE: 98.2 F | WEIGHT: 232 LBS | HEIGHT: 65 IN

## 2024-08-13 DIAGNOSIS — J34.89 SINUS PRESSURE: ICD-10-CM

## 2024-08-13 DIAGNOSIS — R09.81 NASAL CONGESTION: ICD-10-CM

## 2024-08-13 DIAGNOSIS — J32.9 RECURRENT SINUSITIS: ICD-10-CM

## 2024-08-13 DIAGNOSIS — R44.8 FACIAL PRESSURE: ICD-10-CM

## 2024-08-13 DIAGNOSIS — J34.89 SINUS PAIN: ICD-10-CM

## 2024-08-13 DIAGNOSIS — J31.0 CHRONIC RHINITIS: Primary | ICD-10-CM

## 2024-08-13 NOTE — PROGRESS NOTES
MICHAEL Loaiza  [facility]  [address], [address]  [place] [address]  Fax [phone]  Phone [phone]      Visit Type: FOLLOW UP   Chief Complaint   Patient presents with    Recheck allergies     Better, some pressure and congestion.     Dizziness     Is much better           Dizziness      Jazmin Hunt is a 34 y.o. female who presents for follow up, recheck     Symptoms have been present for past few years, seems to be worsening. Admits recurrent sinusitis, sinus pressure and pain, chronic rhinitis with congestion and drainage.     Since last visit she reports she has been using flonase and astelin daily. Feels this has helped. She does admit she still has some mild congestion and drainage but is improved.  She did report today that she had had been having some lightheaded dizziness with walking but this also seemed to improved since starting medications, unsure if this is related. Denies new sinus infection.  Denies other new complaints or concerns.    Past Medical History:   Diagnosis Date    Allergic rhinitis     grass, low eggs, cats    COVID-19 12/2021    mild    Disc disease, degenerative, cervical     2 herniated discs    Lactose intolerance     Obesity     Overactive bladder        Past Surgical History:   Procedure Laterality Date    ENDOSCOPY N/A 2/6/2023    Procedure: ESOPHAGOGASTRODUODENOSCOPY WITH ANESTHESIA;  Surgeon: Laura Holloway MD;  Location: [facility] ENDOSCOPY;  Service: Gastroenterology;  Laterality: N/A;  pre: epigastric pain. dysphagia.  post: normal  no PCP    REFRACTIVE SURGERY  04/2022    TONSILLECTOMY  2012       Family History: Her family history includes Alzheimer's disease in her maternal grandfather; Bipolar disorder in her father and sister; Heart failure in her mother; Hypertension in her maternal grandfather, maternal grandmother, and mother; No Known Problems in her half-brother; Obesity  in her mother.     Social History: She  reports that she has never smoked. She has been exposed to tobacco smoke. She has never used smokeless tobacco. She reports current alcohol use. She reports that she does not currently use drugs after having used the following drugs: Marijuana.    Home Medications:  Echinacea, Sodium Fluoride, Turmeric, azelastine, diphenhydrAMINE, fluticasone, ibuprofen, oxybutynin XL, sodium chloride, and triamcinolone    Allergies:  She is allergic to contrast dye (echo or unknown ct/mr).       Vital Signs:   Temp:  [98.2 °F (36.8 °C)] 98.2 °F (36.8 °C)  Heart Rate:  [61] 61  BP: (121)/(85) 121/85  ENT Physical Exam  Constitutional  Appearance: patient appears well-developed, well-nourished and well-groomed,  Communication/Voice: communication appropriate for developmental age; vocal quality normal;  Head and Face  Appearance: head appears normal, face appears normal and face appears atraumatic;  Palpation: facial palpation normal;  Salivary: glands normal;  Ear  Hearing: intact;  Auricles: bilateral auricles normal;  External Mastoids: bilateral external mastoids normal;  Ear Canals: bilateral ear canals normal;  Tympanic Membranes: bilateral tympanic membranes normal;  Nose  External Nose: nares patent bilaterally; external nose normal; nasal discharge not visible;  Internal Nose: bilateral intranasal mucosa erythematous; nasal septal deviation present; septal deviation is mild; Septum comments: left to right deviaiton, widened septum   bilateral inferior turbinates erythematous;  Oral Cavity/Oropharynx  Lips: normal;  Teeth: normal;  Gums: gingiva normal;  Tongue: normal;  Oral mucosa: normal;  Hard palate: normal;  Soft palate: normal;  Tonsils: normal;  Base of Tongue: normal;  Posterior pharyngeal wall: normal;  Neck  Neck: neck normal; neck palpation normal;  Respiratory  Inspection: breathing unlabored; normal breathing rate;  Cardiovascular  Inspection: extremities are warm and  well perfused;  Neurovestibular  Mental Status: alert and oriented;       Nasal Endoscopy    Date/Time: 8/13/2024 12:07 PM    Performed by: Raul Boateng APRN  Authorized by: Raul Boateng APRN    Consent:     Consent obtained:  Verbal    Risks discussed:  Bleeding, infection, nerve damage and pain    Alternatives discussed:  No treatment, delayed treatment, observation and alternative treatment  Universal protocol:     Procedure explained and questions answered to patient or proxy's satisfaction: yes      Immediately prior to procedure, a time out was called: yes      Patient identity confirmed:  Verbally with patient  Procedure details:     Medication:  Afrin    Instrument: flexible nasal nasal endoscope      Scope location: bilateral nare    Nasal cavity:     Right inferior turbinates: enlarged      Left inferior turbinates: enlarged    Septum:     Findings: inferior thickening      Deviation comment:  Left or right septal deviation, wide septum    Spurs: right    Sinus/ Nasopharynx:     Right middle meatus: normal and patent      Right middle meatus: no obstruction, no polyps and no pus      Left middle meatus: normal and patent      Left middle meatus: no obstruction, no pus and no polyps      Right nasopharynx: normal      patent      Right eustachian tube: normal      patent      Left eustachian tube: normal      patent    Post-procedure details:     Patient tolerance of procedure:  Tolerated well         Result Review       RESULTS REVIEW    I have reviewed the patients old records in the chart.   The following results/records were reviewed:     CT Sinus Without Contrast (05/10/2024 15:22)          Assessment & Plan  Chronic rhinitis    Facial pressure    Recurrent sinusitis    Nasal congestion    Sinus pressure    Sinus pain       Orders Placed This Encounter   Procedures    Nasal endoscopy           Patient appears to be doing fairly well overall reports fairly good improvement on nasal sprays.  We  discussed ongoing treatment and she wishes to continue with conservative measures for now.  We will continue current management and advise recheck in 4 to 6 months.  Continue current management plan.  Continue nasal sprays as previously prescribed.  Saline nasal spray or saline gel to nose three times a day and as needed. Use a bedside humidifier at night. Place Vasoline in nose in the morning and at night.  Place Vasoline in nose in the morning and at night on the center part of the nose (septum) at least 15 minutes prior to the nasal spray. When using the nasal spray, aim towards the outer corner of the eye.   For the best response, use your nasal sprays every day without skipping doses. It may take several weeks before the full effect is acheived.   GENERAL ALLERGY AVOIDANCE: Regular vacuum cleaning is  recommended  to prevent accumulation of allergens. Use adequate filtration, including double thickness bags or HEPA filters on the  outlet. Use air-conditioning where possible. Change central air filters with an allergy rated filter on a regular basis. Install car pollen filters where possible.   Continue Flonase  Continue Astelin  Saline spray      Call with questions or concerns    Return in about 3 months (around 11/13/2024) for Recheck sinus complaints.        Electronically signed by MICHAEL Loaiza 08/13/24 4:17 PM CDT.

## 2024-09-16 ENCOUNTER — OFFICE VISIT (OUTPATIENT)
Dept: FAMILY MEDICINE CLINIC | Facility: CLINIC | Age: 34
End: 2024-09-16
Payer: COMMERCIAL

## 2024-09-16 VITALS
HEIGHT: 65 IN | DIASTOLIC BLOOD PRESSURE: 73 MMHG | RESPIRATION RATE: 18 BRPM | TEMPERATURE: 98.2 F | SYSTOLIC BLOOD PRESSURE: 106 MMHG | WEIGHT: 230 LBS | BODY MASS INDEX: 38.32 KG/M2 | OXYGEN SATURATION: 98 % | HEART RATE: 56 BPM

## 2024-09-16 DIAGNOSIS — E66.09 CLASS 2 OBESITY DUE TO EXCESS CALORIES WITH BODY MASS INDEX (BMI) OF 38.0 TO 38.9 IN ADULT, UNSPECIFIED WHETHER SERIOUS COMORBIDITY PRESENT: ICD-10-CM

## 2024-09-16 DIAGNOSIS — Z00.00 ANNUAL PHYSICAL EXAM: Primary | ICD-10-CM

## 2024-09-16 DIAGNOSIS — E55.9 VITAMIN D DEFICIENCY: Chronic | ICD-10-CM

## 2024-09-16 PROCEDURE — 99395 PREV VISIT EST AGE 18-39: CPT | Performed by: NURSE PRACTITIONER

## 2024-09-16 RX ORDER — METHYLDOPA/HYDROCHLOROTHIAZIDE 250MG-15MG
TABLET ORAL
COMMUNITY

## 2024-09-16 RX ORDER — CALCIUM CARBONATE 300MG(750)
TABLET,CHEWABLE ORAL
COMMUNITY

## 2024-09-20 ENCOUNTER — CLINICAL SUPPORT (OUTPATIENT)
Dept: FAMILY MEDICINE CLINIC | Facility: CLINIC | Age: 34
End: 2024-09-20
Payer: COMMERCIAL

## 2024-09-20 DIAGNOSIS — Z00.00 ANNUAL PHYSICAL EXAM: ICD-10-CM

## 2024-09-20 DIAGNOSIS — E55.9 VITAMIN D DEFICIENCY: ICD-10-CM

## 2024-09-20 PROCEDURE — 36415 COLL VENOUS BLD VENIPUNCTURE: CPT | Performed by: NURSE PRACTITIONER

## 2024-09-21 LAB
25(OH)D3+25(OH)D2 SERPL-MCNC: 36.9 NG/ML (ref 30–100)
ALBUMIN SERPL-MCNC: 4.4 G/DL (ref 3.9–4.9)
ALP SERPL-CCNC: 66 IU/L (ref 44–121)
ALT SERPL-CCNC: 11 IU/L (ref 0–32)
AST SERPL-CCNC: 15 IU/L (ref 0–40)
BILIRUB SERPL-MCNC: 0.3 MG/DL (ref 0–1.2)
BUN SERPL-MCNC: 15 MG/DL (ref 6–20)
BUN/CREAT SERPL: 20 (ref 9–23)
CALCIUM SERPL-MCNC: 9.1 MG/DL (ref 8.7–10.2)
CHLORIDE SERPL-SCNC: 105 MMOL/L (ref 96–106)
CHOLEST SERPL-MCNC: 191 MG/DL (ref 100–199)
CO2 SERPL-SCNC: 24 MMOL/L (ref 20–29)
CREAT SERPL-MCNC: 0.75 MG/DL (ref 0.57–1)
EGFRCR SERPLBLD CKD-EPI 2021: 106 ML/MIN/1.73
ERYTHROCYTE [DISTWIDTH] IN BLOOD BY AUTOMATED COUNT: 13.3 % (ref 11.7–15.4)
GLOBULIN SER CALC-MCNC: 2.1 G/DL (ref 1.5–4.5)
GLUCOSE SERPL-MCNC: 88 MG/DL (ref 70–99)
HCT VFR BLD AUTO: 43.4 % (ref 34–46.6)
HDLC SERPL-MCNC: 54 MG/DL
HGB BLD-MCNC: 14 G/DL (ref 11.1–15.9)
LDLC SERPL CALC-MCNC: 128 MG/DL (ref 0–99)
MAGNESIUM SERPL-MCNC: 2.3 MG/DL (ref 1.6–2.3)
MCH RBC QN AUTO: 29 PG (ref 26.6–33)
MCHC RBC AUTO-ENTMCNC: 32.3 G/DL (ref 31.5–35.7)
MCV RBC AUTO: 90 FL (ref 79–97)
PLATELET # BLD AUTO: 234 X10E3/UL (ref 150–450)
POTASSIUM SERPL-SCNC: 4.5 MMOL/L (ref 3.5–5.2)
PROT SERPL-MCNC: 6.5 G/DL (ref 6–8.5)
RBC # BLD AUTO: 4.82 X10E6/UL (ref 3.77–5.28)
SODIUM SERPL-SCNC: 141 MMOL/L (ref 134–144)
TRIGL SERPL-MCNC: 49 MG/DL (ref 0–149)
TSH SERPL DL<=0.005 MIU/L-ACNC: 2.69 UIU/ML (ref 0.45–4.5)
VLDLC SERPL CALC-MCNC: 9 MG/DL (ref 5–40)
WBC # BLD AUTO: 4.2 X10E3/UL (ref 3.4–10.8)

## 2024-09-23 PROBLEM — E55.9 VITAMIN D DEFICIENCY: Chronic | Status: ACTIVE | Noted: 2024-09-23

## 2024-12-18 ENCOUNTER — OFFICE VISIT (OUTPATIENT)
Dept: FAMILY MEDICINE CLINIC | Facility: CLINIC | Age: 34
End: 2024-12-18
Payer: COMMERCIAL

## 2024-12-18 VITALS
BODY MASS INDEX: 37.99 KG/M2 | TEMPERATURE: 98.2 F | WEIGHT: 228 LBS | DIASTOLIC BLOOD PRESSURE: 79 MMHG | SYSTOLIC BLOOD PRESSURE: 124 MMHG | HEIGHT: 65 IN | RESPIRATION RATE: 16 BRPM | HEART RATE: 67 BPM | OXYGEN SATURATION: 98 %

## 2024-12-18 DIAGNOSIS — N91.5 OLIGOMENORRHEA, UNSPECIFIED TYPE: Primary | Chronic | ICD-10-CM

## 2024-12-18 DIAGNOSIS — E66.812 CLASS 2 OBESITY DUE TO EXCESS CALORIES WITHOUT SERIOUS COMORBIDITY WITH BODY MASS INDEX (BMI) OF 37.0 TO 37.9 IN ADULT: Chronic | ICD-10-CM

## 2024-12-18 DIAGNOSIS — E66.09 CLASS 2 OBESITY DUE TO EXCESS CALORIES WITHOUT SERIOUS COMORBIDITY WITH BODY MASS INDEX (BMI) OF 37.0 TO 37.9 IN ADULT: Chronic | ICD-10-CM

## 2024-12-18 DIAGNOSIS — E28.2 PCOS (POLYCYSTIC OVARIAN SYNDROME): Chronic | ICD-10-CM

## 2024-12-18 DIAGNOSIS — Z23 NEED FOR INFLUENZA VACCINATION: ICD-10-CM

## 2024-12-18 PROCEDURE — 90471 IMMUNIZATION ADMIN: CPT | Performed by: NURSE PRACTITIONER

## 2024-12-18 PROCEDURE — 99214 OFFICE O/P EST MOD 30 MIN: CPT | Performed by: NURSE PRACTITIONER

## 2024-12-18 PROCEDURE — 90656 IIV3 VACC NO PRSV 0.5 ML IM: CPT | Performed by: NURSE PRACTITIONER

## 2024-12-18 RX ORDER — ALBUTEROL SULFATE 90 UG/1
INHALANT RESPIRATORY (INHALATION)
COMMUNITY
Start: 2024-11-21 | End: 2024-12-18

## 2024-12-18 NOTE — PROGRESS NOTES
"        Subjective     Chief Complaint   Patient presents with    Obesity     Discuss weight loss     Menstrual Problem     Last couple of cycles have been inconsistent, hormones are out of whack        Obesity  Menstrual Problem      Patient presents today to discuss weight loss options.  She has tried and failed multiple diet options and is unable to loose weight.      Menstrual cycles have been irregular.  She took depoprovera as a teenager.  States that periods were regular after having her son but are now irregular.  She is now experiencing 40-50 days between cycles, for about 6 months.  She is having mood swings.  She did have left breast pain with last period.  Her LMP started 3 days ago on 12/15/24.  She was diagnosed with PCOS 10 years ago.  She was never treated for PCOS with metformin.  Took clomid to regulate cycle and then was able to convience her first child.  She has a daughter and a son.  She does have chin and upper lip facial hair that she does shave and pluck, and she feels this is getting worse.      Patient's PMR from outside medical facility reviewed and noted.    Review of Systems   Genitourinary:  Positive for menstrual problem.        Otherwise complete ROS reviewed and negative except as mentioned in the HPI.    Past Medical History:   Past Medical History:   Diagnosis Date    Allergic     Seasonal    Allergic rhinitis     grass, low eggs, cats    Anxiety     COVID-19 12/2021    mild    Disc disease, degenerative, cervical     2 herniated discs    Eating disorder     Struggling w/ \"food noise\", overeating, food addiction    GERD (gastroesophageal reflux disease) 2022    managing w/o meds    History of medical problems 2013 /2014    PCOS    Lactose intolerance     Obesity     Overactive bladder     PCOS (polycystic ovarian syndrome)     Pneumonia 2024     Past Surgical History:  Past Surgical History:   Procedure Laterality Date    COSMETIC SURGERY  2015    Lipo    ENDOSCOPY N/A 02/06/2023 "    Procedure: ESOPHAGOGASTRODUODENOSCOPY WITH ANESTHESIA;  Surgeon: Laura Holloway MD;  Location: [facility] ENDOSCOPY;  Service: Gastroenterology;  Laterality: N/A;  pre: epigastric pain. dysphagia.  post: normal  no PCP    REFRACTIVE SURGERY  04/2022    TONSILLECTOMY  2012     Social History:  reports that she has never smoked. She has been exposed to tobacco smoke. She has never used smokeless tobacco. She reports current alcohol use. She reports that she does not currently use drugs after having used the following drugs: Marijuana.    Family History: family history includes Alzheimer's disease in her maternal grandfather; Arthritis in her mother; Bipolar disorder in her father and sister; COPD in her father; Cancer in her mother; Heart disease in her mother; Heart failure in her mother; Hypertension in her maternal grandfather, maternal grandmother, and mother; Mental illness in her sister; No Known Problems in her half-brother; Obesity in her mother.      Allergies:  Allergies   Allergen Reactions    Contrast Dye (Echo Or Unknown Ct/Mr) Anaphylaxis     Medications:  Prior to Admission medications    Medication Sig Start Date End Date Taking? Authorizing Provider   albuterol sulfate  (90 Base) MCG/ACT inhaler INHALE 2 PUFFS EVERY 4 TO 6 HOURS AS NEEDED FOR SHORTNESS OF BREATH OR FOR WHEEZE 11/21/24  Yes Lynette Garcia MD   azelastine (ASTELIN) 0.1 % nasal spray 2 sprays into the nostril(s) as directed by provider 2 (Two) Times a Day. Use in each nostril as directed 6/6/24  Yes Raul Boateng APRN   Cholecalciferol (Vitamin D3) 25 MCG (1000 UT) capsule    Yes Lynette Garcia MD   Echinacea 650 MG capsule Take 650 mg by mouth Daily.   Yes Lynette Garcia MD   fluticasone (FLONASE) 50 MCG/ACT nasal spray 2 sprays into the nostril(s) as directed by provider Daily. 6/6/24  Yes Raul Boateng APRN   Magnesium 400 MG tablet    Yes Lynette Garcia MD   Menatetrenone (Vitamin K2) 100 MCG  "tablet    Yes Lynette Garcia MD   PreviDent 5000 Booster Plus 1.1 % paste Please see attached for detailed directions 2/22/24  Yes Lynette Garcia MD   sodium chloride 0.65 % nasal spray Administer 1 spray into the nostril(s) as directed by provider As Needed for Congestion.   Yes Lynette Garcia MD   Turmeric 500 MG capsule Take 1 capsule by mouth Daily. 6/1/22  Yes Lynette Garcia MD       Objective     Vital Signs: /79 (BP Location: Right arm, Patient Position: Sitting, Cuff Size: Adult)   Pulse 67   Temp 98.2 °F (36.8 °C) (Infrared)   Resp 16   Ht 165.1 cm (65\")   Wt 103 kg (228 lb)   SpO2 98%   BMI 37.94 kg/m²   Physical Exam  Vitals and nursing note reviewed.   Constitutional:       Appearance: She is obese.   HENT:      Head: Normocephalic.   Cardiovascular:      Rate and Rhythm: Normal rate.   Pulmonary:      Effort: Pulmonary effort is normal.   Musculoskeletal:         General: Normal range of motion.   Skin:     General: Skin is warm and dry.   Neurological:      General: No focal deficit present.      Mental Status: She is alert and oriented to person, place, and time.   Psychiatric:         Mood and Affect: Mood normal.         Behavior: Behavior normal.           Advance Care Planning            Results Reviewed:  Glucose   Date Value Ref Range Status   09/20/2024 88 70 - 99 mg/dL Final     BUN   Date Value Ref Range Status   09/20/2024 15 6 - 20 mg/dL Final     Creatinine   Date Value Ref Range Status   09/20/2024 0.75 0.57 - 1.00 mg/dL Final     Sodium   Date Value Ref Range Status   09/20/2024 141 134 - 144 mmol/L Final     Potassium   Date Value Ref Range Status   09/20/2024 4.5 3.5 - 5.2 mmol/L Final     Chloride   Date Value Ref Range Status   09/20/2024 105 96 - 106 mmol/L Final     Total CO2   Date Value Ref Range Status   09/20/2024 24 20 - 29 mmol/L Final     Calcium   Date Value Ref Range Status   09/20/2024 9.1 8.7 - 10.2 mg/dL Final     ALT (SGPT) "   Date Value Ref Range Status   09/20/2024 11 0 - 32 IU/L Final     AST (SGOT)   Date Value Ref Range Status   09/20/2024 15 0 - 40 IU/L Final     WBC   Date Value Ref Range Status   09/20/2024 4.2 3.4 - 10.8 x10E3/uL Final     Hematocrit   Date Value Ref Range Status   09/20/2024 43.4 34.0 - 46.6 % Final     Platelets   Date Value Ref Range Status   09/20/2024 234 150 - 450 x10E3/uL Final     Triglycerides   Date Value Ref Range Status   09/20/2024 49 0 - 149 mg/dL Final     HDL Cholesterol   Date Value Ref Range Status   09/20/2024 54 >39 mg/dL Final     LDL Chol Calc (NIH)   Date Value Ref Range Status   09/20/2024 128 (H) 0 - 99 mg/dL Final         Assessment / Plan     Assessment/Plan     Diagnoses and all orders for this visit:    1. Oligomenorrhea, unspecified type (Primary)  -     Ambulatory Referral to Gynecology    2. Need for influenza vaccination  -     Fluzone >6mos    3. Class 2 obesity due to excess calories without serious comorbidity with body mass index (BMI) of 37.0 to 37.9 in adult    4. PCOS (polycystic ovarian syndrome)    Will refer to GYN for evaluation and management of oligomenorrhea.  Discussed weight loss options including GLP-1 but they are not covered by patient's insurance.            An After Visit Summary was printed and given to the patient at discharge.  Return in about 1 year (around 12/18/2025) for Annual physical.    I have discussed the patient results/orders and plan/recommendation with them at today's visit.      Isabel Potts, APRN   12/18/2024

## 2024-12-18 NOTE — LETTER
[facility]  Vaccine Consent Form    Patient Name:  Jazmin Hunt  Patient :  1990     Vaccine(s) Ordered    Fluzone >6mos        Screening Checklist  The following questions should be completed prior to vaccination. If you answer “yes” to any question, it does not necessarily mean you should not be vaccinated. It just means we may need to clarify or ask more questions. If a question is unclear, please ask your healthcare provider to explain it.    Yes No   Any fever or moderate to severe illness today (mild illness and/or antibiotic treatment are not contraindications)?     Do you have a history of a serious reaction to any previous vaccinations, such as anaphylaxis, encephalopathy within 7 days, Guillain-Roosevelt syndrome within 6 weeks, seizure?     Have you received any live vaccine(s) (e.g MMR, SKY) or any other vaccines in the last month (to ensure duplicate doses aren't given)?     Do you have an anaphylactic allergy to latex (DTaP, DTaP-IPV, Hep A, Hep B, MenB, RV, Td, Tdap), baker’s yeast (Hep B, HPV), polysorbates (RSV, nirsevimab, PCV 20, Rotavirrus, Tdap, Shingrix), or gelatin (SKY, MMR)?     Do you have an anaphylactic allergy to neomycin (Rabies, SKY, MMR, IPV, Hep A), polymyxin B (IPV), or streptomycin (IPV)?      Any cancer, leukemia, AIDS, or other immune system disorder? (SKY, MMR, RV)     Do you have a parent, brother, or sister with an immune system problem (if immune competence of vaccine recipient clinically verified, can proceed)? (MMR, SKY)     Any recent steroid treatments for >2 weeks, chemotherapy, or radiation treatment? (SKY, MMR)     Have you received antibody-containing blood transfusions or IVIG in the past 11 months (recommended interval is dependent on product)? (MMR, SKY)     Have you taken antiviral drugs (acyclovir, famciclovir, valacyclovir for SKY) in the last 24 or 48 hours, respectively?      Are you pregnant or planning to become pregnant within 1 month? (SKY, MMR,  "HPV, IPV, MenB, Abrexvy; For Hep B- refer to Engerix-B; For RSV - Abrysvo is indicated for 32-36 weeks of pregnancy from September to January)     For infants, have you ever been told your child has had intussusception or a medical emergency involving obstruction of the intestine (Rotavirus)? If not for an infant, can skip this question.         *Ordering Physicians/APC should be consulted if \"yes\" is checked by the patient or guardian above.  I have received, read, and understand the Vaccine Information Statement (VIS) for each vaccine ordered.  I have considered my or my child's health status as well as the health status of my close contacts.  I have taken the opportunity to discuss my vaccine questions with my or my child's health care provider.   I have requested that the ordered vaccine(s) be given to me or my child.  I understand the benefits and risks of the vaccines.  I understand that I should remain in the clinic for 15 minutes after receiving the vaccine(s).  _________________________________________________________  Signature of Patient or Parent/Legal Guardian ____________________  Date     "

## 2024-12-31 PROBLEM — E28.2 PCOS (POLYCYSTIC OVARIAN SYNDROME): Chronic | Status: ACTIVE | Noted: 2024-12-31

## 2025-01-08 ENCOUNTER — OFFICE VISIT (OUTPATIENT)
Age: 35
End: 2025-01-08
Payer: COMMERCIAL

## 2025-01-08 VITALS
DIASTOLIC BLOOD PRESSURE: 94 MMHG | SYSTOLIC BLOOD PRESSURE: 141 MMHG | WEIGHT: 225.6 LBS | HEIGHT: 65 IN | BODY MASS INDEX: 37.59 KG/M2

## 2025-01-08 DIAGNOSIS — F32.81 PMDD (PREMENSTRUAL DYSPHORIC DISORDER): ICD-10-CM

## 2025-01-08 DIAGNOSIS — E28.2 PCOS (POLYCYSTIC OVARIAN SYNDROME): Primary | ICD-10-CM

## 2025-01-08 RX ORDER — DROSPIRENONE AND ETHINYL ESTRADIOL 0.02-3(28)
1 KIT ORAL DAILY
Qty: 84 TABLET | Refills: 3 | Status: SHIPPED | OUTPATIENT
Start: 2025-01-08

## 2025-01-08 NOTE — PROGRESS NOTES
Subjective   Jazmin Hunt is a 34 y.o. female.   Chief Complaint   Patient presents with    Gynecologic Exam     New Pt here for Oligomenorrhea, ref from Isabel Potts.         HPI  34-year-old female presents to discuss PCOS and other associated symptoms.  She was diagnosed with PCOS about 10 years ago.  She states that she does not remember what her periods were like when she was a teenager.  She was on Depo-Provera for contraception between the ages of 16 and 19.  She reports a significant amount of weight gain with this medication.  After she went off of Depo she was then on the pill for several years.  She went off the pill when she and her  wanted to try for a baby and it was at this point that she was diagnosed with PCOS because she was not menstruating at all.  She eventually conceived with the help of Clomid, but this was about a 3-year process.  She was then on OCPs after the birth of her daughter in 2015.  She stopped these in 2019 and became spontaneously pregnant after about 2 months and her son was born later that year.  At the time of her PCOS diagnosis she reports having had a full laboratory workup as well as an ultrasound.  Her current symptoms include irregular menses with menstrual periods occurring every 30 to 45 days.  She does not ever skip a full 2 or 3 months in between cycles.  She is also reporting an increase in hair growth on her chin.  She is also concerned because she feels like she is more phillips especially around the time of her cycle.  She is wondering if she could possibly have bipolar depression because she read that this was associated with PCOS.  She is also experiencing fatigue.  Recent laboratory evaluation from 9/20/2024 showed a normal TSH and a borderline low vitamin D level.  Hemoglobin was normal and glucose level was normal as well as the remainder of her metabolic panel.    ROS  Review of relevant systems completed and is as noted in the HPI.    Objective    Physical Exam  General She is alert and oriented in no acute distress  HEENT no obvious hirsutism on the face or chin, but the patient states that she plucks her stray hairs routinely.    Procedures    Office ultrasound formal report is pending.  Films reviewed.  Overall normal appearance to the uterus with some prominent follicles on the ovaries.      Assessment & Plan      34-year-old female with a history of PCOS, now having semiregular cycles but continuing to have hirsutism.  She is also struggling with significant mood swings around the time of her cycle.  We discussed PCOS thoroughly and how the unopposed estrogen level from this condition may place her at risk for endometrial hyperplasia or even uterine cancer.  We discussed how a withdrawal bleed at least every 3 months would be recommended.  Alternatively treatment with continuous OCPs or either oral or intrauterine progesterone will be another alternative to prevent overgrowth of the uterine lining. Due to complaints of increased facial hair growth we will check testosterone levels.  We also discussed her mood symptoms.  I do not believe these are linked to PCOS.  Depression screening was done and was negative.  She does meet the criteria for PMDD and we discussed treatment options for that including regulating hormone levels with continuous OCPs versus using an SSRI.  We also discussed dietary interventions to address both of these issues.  After reviewing the pros and cons of the above, the patient decided she would be willing to proceed with a trial of continuous low-dose OCPs both for treatment of the PCOS as well as treatment of PMDD.  Prescription for yes sent to the pharmacy and I would like to follow-up with her in 6 months.  At her next appointment we will also plan on performing a well woman examination with Pap smear.  Time spent reviewing chart, taking history and counseling patient: 50 minutes    Diagnoses and all orders for this  visit:    1. PCOS (polycystic ovarian syndrome) (Primary)  -     Cancel: Testosterone (Free & Total), LC / MS; Future  -     drospirenone-ethinyl estradiol (NAYELI) 3-0.02 MG per tablet; Take 1 tablet by mouth Daily. Skip placebo pills for 2 packs, then take placebo pills in 3rd pack.  Dispense: 84 tablet; Refill: 3  -     Testosterone (Free & Total), LC / MS    2. PMDD (premenstrual dysphoric disorder)  -     drospirenone-ethinyl estradiol (NAYELI) 3-0.02 MG per tablet; Take 1 tablet by mouth Daily. Skip placebo pills for 2 packs, then take placebo pills in 3rd pack.  Dispense: 84 tablet; Refill: 3             No follow-ups on file.

## 2025-01-12 DIAGNOSIS — J01.01 ACUTE RECURRENT MAXILLARY SINUSITIS: ICD-10-CM

## 2025-01-12 DIAGNOSIS — J34.89 OTHER SPECIFIED DISORDERS OF NOSE AND NASAL SINUSES: ICD-10-CM

## 2025-01-13 NOTE — TELEPHONE ENCOUNTER
Rx Refill Note  Requested Prescriptions     Pending Prescriptions Disp Refills    Azelastine HCl 137 MCG/SPRAY solution [Pharmacy Med Name: AZELASTINE 0.1% (137 MCG) SPRY]  6     Si SPRAYS INTO THE NOSTRIL(S) AS DIRECTED BY PROVIDER 2 (TWO) TIMES A DAY. USE IN EACH NOSTRIL AS DIRECTED      Last office visit with prescribing clinician: 2024   Last telemedicine visit with prescribing clinician: Visit date not found   Next office visit with prescribing clinician: Visit date not found                         Would you like a call back once the refill request has been completed: [] Yes [] No    If the office needs to give you a call back, can they leave a voicemail: [] Yes [] No    Jazmin Chan MA  25, 07:12 CST

## 2025-01-14 RX ORDER — AZELASTINE HYDROCHLORIDE 137 UG/1
SPRAY, METERED NASAL
Qty: 30 ML | Refills: 2 | Status: SHIPPED | OUTPATIENT
Start: 2025-01-14 | End: 2025-02-13

## 2025-01-14 NOTE — TELEPHONE ENCOUNTER
Pt requesting refill.  You originally prescribed this med.  Isabel asked for me to forward the request to you.  Thank you :)

## 2025-01-15 LAB
TESTOST FREE SERPL-MCNC: 0.6 PG/ML (ref 0–4.2)
TESTOST SERPL-MCNC: 22.2 NG/DL (ref 10–55)

## 2025-02-07 ENCOUNTER — TELEMEDICINE (OUTPATIENT)
Dept: FAMILY MEDICINE CLINIC | Facility: CLINIC | Age: 35
End: 2025-02-07
Payer: COMMERCIAL

## 2025-02-07 DIAGNOSIS — J32.9 RHINOSINUSITIS: Primary | ICD-10-CM

## 2025-02-07 PROCEDURE — 99212 OFFICE O/P EST SF 10 MIN: CPT | Performed by: NURSE PRACTITIONER

## 2025-02-07 NOTE — PROGRESS NOTES
"      Chief Complaint  No chief complaint on file.    Subjective           Jazmin Hunt presents to [facility]  History of Present Illness  Feeling sick for 3 weeks.  Netipot, zinc supplement, green tea, cool mist humidifier.  Was feeling a little better yesterday and feels worse today.  Right sinuses hurt and coughing up green sputum and purulent nasal drainage.  Denies fever, shortness of breath, wheezing, sore throat.      Objective   Vital Signs:   There were no vitals taken for this visit.    Estimated body mass index is 37.59 kg/m² as calculated from the following:    Height as of 1/8/25: 165 cm (64.96\").    Weight as of 1/8/25: 102 kg (225 lb 9.6 oz).     Physical Exam   HENT:   Nose: Congestion present.   Psychiatric: She has a normal mood and affect.     Result Review :                   Assessment and Plan      Diagnoses and all orders for this visit:    1. Rhinosinusitis (Primary)  -     amoxicillin-clavulanate (AUGMENTIN) 875-125 MG per tablet; Take 1 tablet by mouth 2 (Two) Times a Day for 7 days.  Dispense: 14 tablet; Refill: 0        Follow Up     Return if symptoms worsen or fail to improve.  Patient was given instructions and counseling regarding her condition or for health maintenance advice. Please see specific information pulled into the AVS if appropriate.     Mode of Visit: Video  Location of patient: other: work  Location of provider: [facility]  You have chosen to receive care through a telehealth visit.  The patient has signed the video visit consent form.  The visit included audio and video interaction. No technical issues occurred during this visit.   "

## 2025-02-19 ENCOUNTER — TELEMEDICINE (OUTPATIENT)
Age: 35
End: 2025-02-19
Payer: COMMERCIAL

## 2025-02-19 DIAGNOSIS — E28.2 PCOS (POLYCYSTIC OVARIAN SYNDROME): Primary | ICD-10-CM

## 2025-02-19 DIAGNOSIS — N92.1 BREAKTHROUGH BLEEDING ON OCPS: ICD-10-CM

## 2025-02-19 DIAGNOSIS — F32.81 PMDD (PREMENSTRUAL DYSPHORIC DISORDER): ICD-10-CM

## 2025-02-19 RX ORDER — DROSPIRENONE 4 MG/1
1 TABLET, FILM COATED ORAL DAILY
Qty: 28 TABLET | Refills: 12 | Status: SHIPPED | OUTPATIENT
Start: 2025-02-19

## 2025-02-19 NOTE — PROGRESS NOTES
Subjective   Chief Complaint   Patient presents with    Menstrual Problem     Started on Sally OCPs for PCOS and PMDD.  Having breakthrough spotting, nausea, headaches.        TORI Hunt is a 34 y.o. female.       History of Present Illness  The patient presents via virtual visit for evaluation of polycystic ovarian syndrome and premenstrual dysphoric disorder.    She began experiencing spotting approximately 5 to 6 weeks after starting the low dose OCP medication. Despite doubling the dose, the spotting persisted. She also reports intermittent nausea throughout the day along with headaches. She then took the placebo pills for 4 to 5 days, but the bleeding continued. She has been experiencing light bleeding for the past 5 days, which has remained constant. She discontinued the medication after receiving a message from [employer], resulting in the cessation of nausea and headaches. She also reported feeling dehydrated and increased her water intake but this did not help the nausea or the headaches. Initially, she took the medication with breakfast but switched to taking it at night due to severe nausea that interfered with her ability to drive to work.   She has never used patches or inserts for birth control. She has no plans for future pregnancies as her  has undergone a vasectomy. She has not taken any birth control since the birth of her son. Her menstrual cycles are irregular, occurring anywhere from 3 to 8 weeks apart, but she has not experienced an extended period without a cycle. Her periods typically last 1 day with heavy flow, followed by 2 days of light flow before ceasing. She has a history of using Depo-Provera for 3 years, from ages 16 to 19, after which she was advised to switch to oral contraceptives. She used these pills for 4 to 5 years before attempting to conceive. During this time, she was diagnosed with PCOS and prescribed Clomid, which did not result in pregnancy. A year later, after  her  was tested, she underwent another round of Clomid, which was successful. She resumed birth control pills after the birth of her daughter, discontinuing them when her daughter turned 2, and became pregnant with her son within a month. She does not recall experiencing nausea or headaches while on birth control pills prior to her pregnancies. She remembers being prescribed Sally during her time in [place].    She was referred by Isabel Potts NP, for PMDD and is interested in understanding how the proposed treatment options would address this condition. She prefers to explore other options before considering antidepressants.        ROS  Review of relevant systems completed and is as noted in the HPI.    Past OB/Gyn, Medical, Surgical, Social and Family history reviewed and updated by me as appropriate.     MEDICATIONS AND ALLERGIES ON CHART AND REVIEWED.      Objective   Physical Exam  General:  she is alert and oriented, no acute distress    Procedures  N/a    Assessment & Plan        Diagnoses and all orders for this visit:    1. PCOS (polycystic ovarian syndrome) (Primary)  -     Drospirenone (Slynd) 4 MG tablet; Take 1 tablet by mouth Daily.  Dispense: 28 tablet; Refill: 12    2. PMDD (premenstrual dysphoric disorder)  -     Drospirenone (Slynd) 4 MG tablet; Take 1 tablet by mouth Daily.  Dispense: 28 tablet; Refill: 12    3. Breakthrough bleeding on OCPs      I spent 47 minutes caring for this patient on this date of service. This time includes time spent by me in the following activities:preparing for the visit, reviewing tests, performing a medically appropriate examination and/or evaluation, counseling and educating the patient/family/caregiver, ordering medications, tests, or procedures, and documenting information in the medical record.         Return in about 3 months (around 5/19/2025).    Assessment & Plan  1. Polycystic Ovarian Syndrome (PCOS).  The patient has been experiencing nausea and  headaches, likely due to the estradiol component of her current birth control pills, Sally. Despite being on the lowest dose of estrogen, these symptoms persist, indicating that estrogen-containing pills are not suitable for her.  The Sally was prescribed to manage her PCOS and premenstrual mood changes. However, it appears that Sally is not well-tolerated by her body at this time. Her testosterone levels are within the normal range. She has been reassured that as long as she experiences some form of bleeding at least once every 3 months, this will decrease her risk of developing hyperplasia or early uterine cancer. A prescription for Slynd, a progesterone-only pill, will be sent to her pharmacy, [facility] in [place]. She is advised to start taking the new pills upon receipt, and her bleeding should cease within the first few days of use. If she encounters any issues with insurance coverage, she is instructed to contact the office tomorrow to arrange for sample collection.    2. Premenstrual Dysphoric Disorder (PMDD).  The patient has been experiencing mood changes associated with PMDD. The Slynd pill, which contains drospirenone, the same progestin found in Sally, is expected to help regulate her PMDD symptoms. If she continues to experience PMDD symptoms despite taking Slynd, she may consider skipping the placebo pills for a few months to see if that helps. Another option discussed is the use of traditional antidepressants, which can help manage the neurotransmitter aspects of PMDD. However, she prefers to try other options before resorting to antidepressants.    3.  Breakthough bleeding.  She has also reported breakthrough bleeding on the Sally.  We discussed how this is common with low-dose OCPs, and she may actually see this with the progesterone-only pill as well.  We will have her take the placebo pills each month to start with in an effort to decrease risk of further breakthrough bleeding.    Follow-up  The patient will  follow up in 2 to 3 months to assess her response to the new medication and determine if any adjustments are necessary.    Patient or patient representative verbalized consent for the use of Ambient Listening during the visit with  Elina Haji MD for chart documentation. 2/19/2025  10:45 CST    Elina Haji MD  [facility]

## 2025-02-21 ENCOUNTER — TELEPHONE (OUTPATIENT)
Age: 35
End: 2025-02-21
Payer: COMMERCIAL

## 2025-02-21 NOTE — TELEPHONE ENCOUNTER
Please call [facility] to follow up on the progesterone pill prescription.  She said Slynd was not covered by her insurance and I suggested coming by to  some samples.  However, another option would be for me to prescribe Micronor for her since this comes in a generic and should be inexpensive.  We discussed Micronor at her video visit the other day as an option (not as ideal as Slynd, but should work ok for her PCOS and PMDD symptoms).  Let me know if she would like to try that instead of using the Slynd samples.  It would probably be a more sustainable long-term option.  Thanks.

## 2025-02-22 DIAGNOSIS — L30.9 ECZEMA, UNSPECIFIED TYPE: ICD-10-CM

## 2025-02-24 RX ORDER — TRIAMCINOLONE ACETONIDE 1 MG/G
1 OINTMENT TOPICAL 2 TIMES DAILY
Qty: 80 G | Refills: 0 | OUTPATIENT
Start: 2025-02-24

## 2025-02-25 DIAGNOSIS — E28.2 PCOS (POLYCYSTIC OVARIAN SYNDROME): Primary | Chronic | ICD-10-CM

## 2025-02-25 RX ORDER — ACETAMINOPHEN AND CODEINE PHOSPHATE 120; 12 MG/5ML; MG/5ML
1 SOLUTION ORAL DAILY
Qty: 28 TABLET | Refills: 12 | Status: SHIPPED | OUTPATIENT
Start: 2025-02-25 | End: 2026-02-25

## 2025-03-25 RX ORDER — FLUTICASONE PROPIONATE 50 MCG
2 SPRAY, SUSPENSION (ML) NASAL DAILY
Qty: 16 G | Refills: 2 | Status: SHIPPED | OUTPATIENT
Start: 2025-03-25

## 2025-05-02 ENCOUNTER — OFFICE VISIT (OUTPATIENT)
Dept: FAMILY MEDICINE CLINIC | Facility: CLINIC | Age: 35
End: 2025-05-02
Payer: COMMERCIAL

## 2025-05-02 VITALS
HEIGHT: 65 IN | WEIGHT: 230 LBS | BODY MASS INDEX: 38.32 KG/M2 | OXYGEN SATURATION: 98 % | TEMPERATURE: 98.6 F | SYSTOLIC BLOOD PRESSURE: 120 MMHG | DIASTOLIC BLOOD PRESSURE: 75 MMHG | HEART RATE: 76 BPM

## 2025-05-02 DIAGNOSIS — H65.06 RECURRENT ACUTE SEROUS OTITIS MEDIA OF BOTH EARS: Primary | ICD-10-CM

## 2025-05-02 DIAGNOSIS — J01.10 SUBACUTE FRONTAL SINUSITIS: ICD-10-CM

## 2025-05-02 DIAGNOSIS — J30.1 SEASONAL ALLERGIC RHINITIS DUE TO POLLEN: ICD-10-CM

## 2025-05-02 PROCEDURE — 99213 OFFICE O/P EST LOW 20 MIN: CPT | Performed by: INTERNAL MEDICINE

## 2025-05-02 RX ORDER — GINSENG 100 MG
CAPSULE ORAL
COMMUNITY
Start: 2024-01-01

## 2025-05-02 RX ORDER — AMOXICILLIN 875 MG/1
875 TABLET, COATED ORAL 2 TIMES DAILY
Qty: 20 TABLET | Refills: 0 | Status: SHIPPED | OUTPATIENT
Start: 2025-05-02 | End: 2025-05-12

## 2025-05-02 RX ORDER — CALCIUM/MAGNESIUM/ZINC 333-133 MG
TABLET ORAL
COMMUNITY
Start: 2024-01-01

## 2025-05-02 RX ORDER — METHYLPREDNISOLONE 4 MG/1
TABLET ORAL
Qty: 21 TABLET | Refills: 0 | Status: SHIPPED | OUTPATIENT
Start: 2025-05-02

## 2025-05-02 RX ORDER — AZELASTINE HYDROCHLORIDE 137 UG/1
2 SPRAY, METERED NASAL 2 TIMES DAILY
COMMUNITY
Start: 2025-03-26

## 2025-05-02 NOTE — PROGRESS NOTES
"        Subjective     Chief Complaint   Patient presents with    Headache     4 day span     Ear Fullness     Worsening        Headache  Ear Fullness  Associated symptoms: headaches      History of Present Illness  The patient presents for evaluation of bilateral otitis media and sinusitis.    The chief complaint is a persistent headache for the past 5 days, described as a dull, band-like pain. Over the last 2 days, she has experienced an unusual sensation of pressure or fullness around the base of her ears, specifically in the cartilage area rather than the ear canal. There is a history of sinus infections, but currently, no nasal discharge is reported. She has been using a neti pot with saline solution approximately once every 2 weeks, but did not use it this time due to the absence of congestion. An antibiotic was previously prescribed by Isabel during a visit in 02/2025. Astelin and Flonase nasal sprays were prescribed by an ENT specialist, Dr. Benny Smith, last fall. A deviated septum was noted, but surgical correction was deemed unnecessary.    Sinus issues have been a struggle since moving to this area, particularly severe this time of year, although improvement compared to the previous year is noted.     Micronor tablets (norethindrone) are taken for birth control and progesterone medication to help with mood swings. The medication has been helping, but mood swings still occur when her cycle is about to start. She also takes vitamin D, Echinacea, magnesium, vitamin K2, turmeric, and zinc.    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 She has 2 children, ages 9 and 6.      Past Medical History:   Past Medical History:   Diagnosis Date    Allergic rhinitis     grass, low eggs, cats    Anxiety     COVID-19 12/2021    mild    Disc disease, degenerative, cervical     2 herniated discs    Eating disorder     Struggling w/ \"food noise\", overeating, food addiction    GERD (gastroesophageal reflux " disease) 2022    managing w/o meds    Lactose intolerance     Obesity     Overactive bladder     PCOS (polycystic ovarian syndrome)     PMDD (premenstrual dysphoric disorder)     Pneumonia 2024     Past Surgical History:  Past Surgical History:   Procedure Laterality Date    COSMETIC SURGERY  2015    Lipo    ENDOSCOPY N/A 02/06/2023    Procedure: ESOPHAGOGASTRODUODENOSCOPY WITH ANESTHESIA;  Surgeon: Laura Holloway MD;  Location: [facility] ENDOSCOPY;  Service: Gastroenterology;  Laterality: N/A;  pre: epigastric pain. dysphagia.  post: normal  no PCP    REFRACTIVE SURGERY  04/2022    TONSILLECTOMY  2012     Social History:  reports that she has never smoked. She has been exposed to tobacco smoke. She has never used smokeless tobacco. She reports current alcohol use. She reports that she does not currently use drugs after having used the following drugs: Marijuana.    Family History: family history includes Alzheimer's disease in her maternal grandfather; Arthritis in her mother; Bipolar disorder in her father and sister; Breast cancer in her mother; COPD in her father; Heart disease in her mother; Heart failure in her mother; Hypertension in her maternal grandfather, maternal grandmother, and mother; Mental illness in her sister; No Known Problems in her half-brother; Obesity in her mother.      Allergies:  Allergies   Allergen Reactions    Contrast Dye (Echo Or Unknown Ct/Mr) Anaphylaxis     Medications:  Prior to Admission medications    Medication Sig Start Date End Date Taking? Authorizing Provider   Azelastine HCl 137 MCG/SPRAY solution 2 sprays by Each Nare route 2 (Two) Times a Day. 3/26/25  Yes ProviderLynette MD   Cholecalciferol (Vitamin D3) 25 MCG (1000 UT) capsule    Yes ProviderLynette MD   Echinacea 400 MG capsule  1/1/24  Yes ProviderLynette MD   fluticasone (FLONASE) 50 MCG/ACT nasal spray SPRAY 2 SPRAYS INTO THE NOSTRIL AS DIRECTED BY PROVIDER DAILY. 3/25/25  Yes Raul Boateng, APRN  "  Magnesium 400 MG tablet    Yes Lynette Garcia MD   Menatetrenone (Vitamin K2) 100 MCG tablet    Yes Lynette Garcia MD   norethindrone (MICRONOR) 0.35 MG tablet Take 1 tablet by mouth Daily. 2/25/25 2/25/26 Yes Elina Haji MD   PreviDent 5000 Booster Plus 1.1 % paste Please see attached for detailed directions 2/22/24  Yes Lynette Garcia MD   Turmeric 500 MG capsule Take 1 capsule by mouth Daily. 6/1/22  Yes Lynette Garcia MD   Zinc 50 MG tablet  1/1/24  Yes Lynette Garcia MD   amoxicillin (AMOXIL) 875 MG tablet Take 1 tablet by mouth 2 (Two) Times a Day for 10 days. 5/2/25 5/12/25  Caesar Dias MD   methylPREDNISolone (MEDROL) 4 MG dose pack Take as directed on package instructions. 5/2/25   Caesar Dias MD   sodium chloride 0.65 % nasal spray Administer 1 spray into the nostril(s) as directed by provider As Needed for Congestion.  Patient not taking: Reported on 5/2/2025    Lynette Garcia MD           Review of systems   negative unless otherwise specified above in HPI    Objective     Vital Signs: /75 (BP Location: Right arm, Patient Position: Sitting, Cuff Size: Large Adult)   Pulse 76   Temp 98.6 °F (37 °C) (Infrared)   Ht 165 cm (64.96\")   Wt 104 kg (230 lb)   SpO2 98%   BMI 38.32 kg/m²     Physical Exam  Vitals reviewed.   Constitutional:       Appearance: Normal appearance. She is obese. She is not ill-appearing or toxic-appearing.   HENT:      Head: Normocephalic and atraumatic.      Right Ear: Hearing normal. A middle ear effusion is present. Tympanic membrane is erythematous and bulging.      Left Ear: Hearing normal. A middle ear effusion is present. Tympanic membrane is erythematous and bulging.      Mouth/Throat:      Lips: Pink.      Pharynx: Oropharynx is clear. Uvula midline. No oropharyngeal exudate or posterior oropharyngeal erythema.      Tonsils: No tonsillar exudate. 1+ on the right. 1+ on the left.   Eyes:      " Extraocular Movements: Extraocular movements intact.      Conjunctiva/sclera: Conjunctivae normal.      Pupils: Pupils are equal, round, and reactive to light.   Cardiovascular:      Rate and Rhythm: Normal rate and regular rhythm.      Pulses: Normal pulses.      Heart sounds: Normal heart sounds. No murmur heard.     No friction rub. No gallop.   Pulmonary:      Effort: Pulmonary effort is normal.      Breath sounds: Normal breath sounds.   Abdominal:      General: Abdomen is flat. Bowel sounds are normal.      Palpations: Abdomen is soft.   Musculoskeletal:         General: Normal range of motion.      Cervical back: Neck supple.   Lymphadenopathy:      Cervical: Cervical adenopathy (R>L) present.   Skin:     General: Skin is warm and dry.      Capillary Refill: Capillary refill takes less than 2 seconds.      Findings: No rash.   Neurological:      General: No focal deficit present.      Mental Status: She is alert.       Physical Exam  Ears: Fluid and hint of redness in one ear, significant fluid and some discoloration in the other ear           Results          Assessment / Plan     Assessment/Plan:   Diagnosis Plan   1. Recurrent acute serous otitis media of both ears  amoxicillin (AMOXIL) 875 MG tablet      2. Subacute frontal sinusitis  methylPREDNISolone (MEDROL) 4 MG dose pack      3. Seasonal allergic rhinitis due to pollen  methylPREDNISolone (MEDROL) 4 MG dose pack          Assessment & Plan  1. Bilateral otitis media.  - Symptoms include headache, pressure around the base of the ears, and fluid in both ears with some redness and discoloration.  - Physical exam findings indicate fluid and redness in the ears.  - Discussed the condition and the need for antibiotic treatment.  - A prescription for amoxicillin will be sent to [facility] in [place]. If there is no improvement by Monday morning, a reevaluation and potential change in antibiotic regimen will be considered.    2. Sinusitis.  - Symptoms  include headache and nasal pressure.  - Physical exam findings suggest fluid in the sinuses.  - Discussed the condition and the need for continued use of nasal sprays and additional treatment.  - The patient will continue using Astelin and Flonase nasal sprays. A Medrol Dosepak will be prescribed, with instructions to take 6 pills on the first day, decreasing by one pill each subsequent day. A steroid injection will also be administered today.    3. Allergic rhinitis.  - The patient reports improvement with current nasal spray regimen.  - Physical exam findings indicate clear nasal passages.  - Discussed the ongoing management of allergic rhinitis.  - The patient will continue using Astelin and Flonase nasal sprays.      Return if symptoms worsen or fail to improve. unless patient needs to be seen sooner or acute issues arise.      I have discussed the patient results/orders and and plan/recommendation with them at today's visit.      Signed by:    Dr. Caesar Dias Date: 05/02/25    EMR Dictation/Transcription disclaimer:   Some of this note may be an electronic transcription/translation of spoken language to printed text. The electronic translation of spoken language may permit erroneous, or at times, nonsensical words or phrases to be inadvertently transcribed; Although I have reviewed the note for such errors, some may still exist.      Patient or patient representative verbalized consent for the use of Ambient Listening during the visit with  Caesar Dias MD for chart documentation. 5/2/2025  14:36 CDT

## (undated) DEVICE — Device: Brand: DEFENDO AIR/WATER/SUCTION AND BIOPSY VALVE

## (undated) DEVICE — CONMED SCOPE SAVER BITE BLOCK, 20X27 MM: Brand: SCOPE SAVER

## (undated) DEVICE — THE CHANNEL CLEANING BRUSH IS A NYLON FLEXI BRUSH ATTACHED TO A FLEXIBLE PLASTIC SHEATH DESIGNED TO SAFELY REMOVE DEBRIS FROM FLEXIBLE ENDOSCOPES.

## (undated) DEVICE — YANKAUER,BULB TIP WITH VENT: Brand: ARGYLE

## (undated) DEVICE — CUFF,BP,DISP,1 TUBE,ADULT,HP: Brand: MEDLINE

## (undated) DEVICE — SENSR O2 OXIMAX FNGR A/ 18IN NONSTR